# Patient Record
Sex: FEMALE | Race: WHITE | NOT HISPANIC OR LATINO | URBAN - METROPOLITAN AREA
[De-identification: names, ages, dates, MRNs, and addresses within clinical notes are randomized per-mention and may not be internally consistent; named-entity substitution may affect disease eponyms.]

---

## 2023-09-06 ENCOUNTER — TELEPHONE (OUTPATIENT)
Dept: PRIMARY CARE | Facility: CLINIC | Age: 58
End: 2023-09-06
Payer: COMMERCIAL

## 2023-09-06 NOTE — TELEPHONE ENCOUNTER
Called patient and scheduled patient for hospital follow up for 9/8/23 at 130pm. Patient was discharged on 9/5/23. Patient doing well doesn't have any questions at this time.

## 2023-09-08 ENCOUNTER — OFFICE VISIT (OUTPATIENT)
Dept: PRIMARY CARE | Facility: CLINIC | Age: 58
End: 2023-09-08
Payer: COMMERCIAL

## 2023-09-08 VITALS
OXYGEN SATURATION: 98 % | DIASTOLIC BLOOD PRESSURE: 62 MMHG | WEIGHT: 174 LBS | HEART RATE: 65 BPM | BODY MASS INDEX: 32.88 KG/M2 | SYSTOLIC BLOOD PRESSURE: 110 MMHG

## 2023-09-08 DIAGNOSIS — Z00.00 ANNUAL PHYSICAL EXAM: Primary | ICD-10-CM

## 2023-09-08 DIAGNOSIS — Z79.4 TYPE 2 DIABETES MELLITUS WITH DIABETIC POLYNEUROPATHY, WITH LONG-TERM CURRENT USE OF INSULIN (MULTI): ICD-10-CM

## 2023-09-08 DIAGNOSIS — I48.0 PAROXYSMAL ATRIAL FIBRILLATION (MULTI): ICD-10-CM

## 2023-09-08 DIAGNOSIS — E78.5 HYPERLIPIDEMIA, UNSPECIFIED HYPERLIPIDEMIA TYPE: ICD-10-CM

## 2023-09-08 DIAGNOSIS — J43.8 OTHER EMPHYSEMA (MULTI): ICD-10-CM

## 2023-09-08 DIAGNOSIS — E11.42 TYPE 2 DIABETES MELLITUS WITH DIABETIC POLYNEUROPATHY, WITH LONG-TERM CURRENT USE OF INSULIN (MULTI): ICD-10-CM

## 2023-09-08 DIAGNOSIS — F41.9 ANXIETY AND DEPRESSION: ICD-10-CM

## 2023-09-08 DIAGNOSIS — E11.42 DIABETIC POLYNEUROPATHY ASSOCIATED WITH TYPE 2 DIABETES MELLITUS (MULTI): ICD-10-CM

## 2023-09-08 DIAGNOSIS — I10 BENIGN ESSENTIAL HYPERTENSION: ICD-10-CM

## 2023-09-08 DIAGNOSIS — F32.A ANXIETY AND DEPRESSION: ICD-10-CM

## 2023-09-08 DIAGNOSIS — J84.9 INTERSTITIAL LUNG DISEASE (MULTI): ICD-10-CM

## 2023-09-08 PROBLEM — E11.9 DIABETES MELLITUS (MULTI): Status: ACTIVE | Noted: 2023-09-08

## 2023-09-08 PROCEDURE — 1036F TOBACCO NON-USER: CPT | Performed by: STUDENT IN AN ORGANIZED HEALTH CARE EDUCATION/TRAINING PROGRAM

## 2023-09-08 PROCEDURE — 3078F DIAST BP <80 MM HG: CPT | Performed by: STUDENT IN AN ORGANIZED HEALTH CARE EDUCATION/TRAINING PROGRAM

## 2023-09-08 PROCEDURE — 3074F SYST BP LT 130 MM HG: CPT | Performed by: STUDENT IN AN ORGANIZED HEALTH CARE EDUCATION/TRAINING PROGRAM

## 2023-09-08 PROCEDURE — 96127 BRIEF EMOTIONAL/BEHAV ASSMT: CPT | Performed by: STUDENT IN AN ORGANIZED HEALTH CARE EDUCATION/TRAINING PROGRAM

## 2023-09-08 PROCEDURE — 99396 PREV VISIT EST AGE 40-64: CPT | Performed by: STUDENT IN AN ORGANIZED HEALTH CARE EDUCATION/TRAINING PROGRAM

## 2023-09-08 PROCEDURE — 99214 OFFICE O/P EST MOD 30 MIN: CPT | Performed by: STUDENT IN AN ORGANIZED HEALTH CARE EDUCATION/TRAINING PROGRAM

## 2023-09-08 RX ORDER — INSULIN GLARGINE 100 [IU]/ML
INJECTION, SOLUTION SUBCUTANEOUS NIGHTLY
COMMUNITY
Start: 2023-03-25 | End: 2023-10-27 | Stop reason: SDUPTHER

## 2023-09-08 RX ORDER — SERTRALINE HYDROCHLORIDE 100 MG/1
150 TABLET, FILM COATED ORAL DAILY
Qty: 90 TABLET | Refills: 1 | Status: SHIPPED | OUTPATIENT
Start: 2023-09-08 | End: 2023-10-27 | Stop reason: SDUPTHER

## 2023-09-08 RX ORDER — BUSPIRONE HYDROCHLORIDE 7.5 MG/1
7.5 TABLET ORAL 2 TIMES DAILY
COMMUNITY
Start: 2022-11-17 | End: 2023-09-08 | Stop reason: SDUPTHER

## 2023-09-08 RX ORDER — BUSPIRONE HYDROCHLORIDE 7.5 MG/1
7.5 TABLET ORAL 2 TIMES DAILY
Qty: 180 TABLET | Refills: 1 | Status: SHIPPED | OUTPATIENT
Start: 2023-09-08 | End: 2023-10-27 | Stop reason: SDUPTHER

## 2023-09-08 RX ORDER — GABAPENTIN 300 MG/1
300 CAPSULE ORAL 4 TIMES DAILY
COMMUNITY
Start: 2018-01-05 | End: 2023-09-08 | Stop reason: SDUPTHER

## 2023-09-08 RX ORDER — GABAPENTIN 300 MG/1
300 CAPSULE ORAL 4 TIMES DAILY
Qty: 120 CAPSULE | Refills: 1 | Status: SHIPPED | OUTPATIENT
Start: 2023-09-08 | End: 2023-09-28

## 2023-09-08 RX ORDER — ALLOPURINOL 300 MG/1
300 TABLET ORAL DAILY
COMMUNITY
End: 2023-10-27 | Stop reason: SDUPTHER

## 2023-09-08 RX ORDER — LIRAGLUTIDE 6 MG/ML
1.8 INJECTION SUBCUTANEOUS DAILY
COMMUNITY
End: 2023-10-27 | Stop reason: SDUPTHER

## 2023-09-08 RX ORDER — CHOLECALCIFEROL (VITAMIN D3) 25 MCG
25 TABLET ORAL DAILY
COMMUNITY
Start: 2023-08-20 | End: 2023-10-27 | Stop reason: SDUPTHER

## 2023-09-08 RX ORDER — LORAZEPAM 1 MG/1
1 TABLET ORAL EVERY 6 HOURS PRN
COMMUNITY
Start: 2022-03-01 | End: 2024-02-06 | Stop reason: SDUPTHER

## 2023-09-08 RX ORDER — PIOGLITAZONEHYDROCHLORIDE 45 MG/1
45 TABLET ORAL
COMMUNITY
Start: 2023-09-05 | End: 2023-10-27 | Stop reason: SDUPTHER

## 2023-09-08 RX ORDER — METOPROLOL SUCCINATE 50 MG/1
50 TABLET, EXTENDED RELEASE ORAL DAILY
COMMUNITY
End: 2023-10-27 | Stop reason: SDUPTHER

## 2023-09-08 RX ORDER — PREDNISONE 20 MG/1
20 TABLET ORAL DAILY
COMMUNITY
Start: 2023-09-05 | End: 2023-10-24 | Stop reason: ALTCHOICE

## 2023-09-08 RX ORDER — BLOOD-GLUCOSE METER
KIT MISCELLANEOUS
COMMUNITY
Start: 2017-11-28 | End: 2024-02-13 | Stop reason: SDUPTHER

## 2023-09-08 RX ORDER — SERTRALINE HYDROCHLORIDE 100 MG/1
150 TABLET, FILM COATED ORAL DAILY
COMMUNITY
End: 2023-09-08 | Stop reason: SDUPTHER

## 2023-09-08 RX ORDER — ALBUTEROL SULFATE 90 UG/1
2 AEROSOL, METERED RESPIRATORY (INHALATION) EVERY 6 HOURS PRN
COMMUNITY

## 2023-09-08 RX ORDER — ATORVASTATIN CALCIUM 80 MG/1
80 TABLET, FILM COATED ORAL DAILY
COMMUNITY
End: 2023-10-27 | Stop reason: SDUPTHER

## 2023-09-08 RX ORDER — ACETAMINOPHEN 500 MG
500 TABLET ORAL AS NEEDED
COMMUNITY
Start: 2023-06-17

## 2023-09-08 RX ORDER — NYSTATIN 100000 [USP'U]/ML
10 SUSPENSION ORAL
COMMUNITY
Start: 2023-09-05 | End: 2024-02-05 | Stop reason: WASHOUT

## 2023-09-08 RX ORDER — ROPINIROLE 1 MG/1
1 TABLET, FILM COATED ORAL NIGHTLY
COMMUNITY
Start: 2022-06-29 | End: 2023-12-06 | Stop reason: SDUPTHER

## 2023-09-08 NOTE — PROGRESS NOTES
Subjective   Patient ID: Radha Dela Cruz is a 58 y.o. female who presents for the following    Assessment/Plan     KESHAV 9/8/2023  Physical: 9/8/2023    #Acute interstitial lung disease; NOS  #Hx of Septic Shock  #HTN  #HLD  #DM-2  #DOMINGO  #Paroxysmal A-Fib  #Preventative Medicine   #COPD    -Colonoscopy 4-5 years ago; due for one now as she had multiple polyps removed. Will give referall to GI  -MMG: had one 2-3 years ago. Pt would like to wait until her current lung issues are resolved before MMG.   -PAP Smear: patient had cervix and uterus removed in 1999.   -UTD: has not had COVID vaccine. Does not want it. Will get flu shot after lung disease is treated.   -PHQ2: 0 while on medication   -Hospital day of discharge labs reviewed     PLAN  Labs: CBC, CMP, lipid profile, A1c  CXR order given   Refill gabapentin  Refill zoloft  Refill buspar  Continue prednisone 20mg daily.   Albuterol PRN for SOB   Continue insulin, actos for DM. Will adjust pending A1c.   Continue xarelto for A-Fib   Edema in legs is non-pitting and likely venous stasis related.   In the long term patient may need open lung bx if symptoms reoccur or worsen.       Follow up in 1 month   Follow up with pulmonary medicine as scheduled       OARRS reviewed.   PDMP consent signed and in chart 9/8/2023. UDS to be done at next visit.     HPI  58F presents as post hospital follow up.   She was admitted for sepsis, acute hypoxic respiratory failure and possible acute interstitial pneumonia vs  vs AEP. Pt was treated in the hospital with IV steroids, Inhaled bronchodilators, and IV abx. She also had bronchoscopy done by pulmonary medicine. Pt was discharged on 9/5/2023 in improved and stable state.     Currently she still has some SOB and cough but it is improved from when in hospital. She is able to walk without use of supplemental O2. Denies any new symptoms that have occurred. Cough is mostly present when she lays down. Denies any PND or orthopnea. Has mild  BLLE edema.     Denies fevers, chills, weight loss, lightheadedness, dizziness, vision changes, sore throat, runny nose, CP, n/v/d, abd pain, black/bloody stools, arthralgias, or new numbness/weakness/tingling in arms/legs/face.      No other complaints or concerns at this time.     PHQ2: 0 while on medication    PMH: Gout, HLD, DM, Anxiety, Paroxymal A-Fib,     Social Hx  T: quit about 2 weeks ago  A: denies  D: denies    Family Hx:   Maternal: No significant hx reported outside of CVA  Paternal: CAD    Lives with , sister, and son        Visit Vitals  /62   Pulse 65   Wt 78.9 kg (174 lb)   SpO2 98%   BMI 32.88 kg/m²   Smoking Status Never   BSA 1.84 m²     PHYSICAL EXAM     Physical Exam     Visit Vitals  /62   Pulse 65   Wt 78.9 kg (174 lb)   SpO2 98%   BMI 32.88 kg/m²   Smoking Status Never   BSA 1.84 m²        General: NAD. NCAT. Aox3   HEENT: PERRLA. EOMI. MMM. Nares patent bl.  Cardiovascular: RRR. No MRG. S1/S2 wnl.   Respiratory: No wheezing, rales, rhonchi. No acute respiratory distress. Minimally prolonged expiratory phase.   GI: Soft, NT abdomen. BS present x 4.   MSK: ROM x 4. CTLS non-tender.   Extremities: BL trace non-pitting edema of the LEs. Cap refill < 2 sec.   Skin: No rashes or bruises.   Neuro: Aox3. Cranial Nerves grossly intact. Motor/sensory wnl.   Psych: Mood wnl.    REVIEW OF SYSTEMS     ROS In HPI     No Known Allergies    Current Outpatient Medications   Medication Sig Dispense Refill    acetaminophen (Tylenol) 500 mg tablet Take 1 tablet (500 mg) by mouth if needed.      albuterol 90 mcg/actuation inhaler Inhale 2 puffs every 6 hours if needed for shortness of breath.      allopurinol (Zyloprim) 300 mg tablet Take 1 tablet (300 mg) by mouth once daily.      atorvastatin (Lipitor) 80 mg tablet Take 1 tablet (80 mg) by mouth once daily.      busPIRone (Buspar) 7.5 mg tablet Take 1 tablet (7.5 mg) by mouth 2 times a day.      cholecalciferol (Vitamin D-3) 25 MCG (1000  UT) tablet Take 1 tablet (25 mcg) by mouth once daily.      FreeStyle Lite Strips strip       gabapentin (Neurontin) 300 mg capsule Take 1 capsule (300 mg) by mouth 4 times a day.      Lantus Solostar U-100 Insulin 100 unit/mL (3 mL) pen Inject under the skin once daily at bedtime.      LORazepam (Ativan) 1 mg tablet Take 1 tablet (1 mg) by mouth every 6 hours if needed.      metoprolol succinate XL (Toprol-XL) 50 mg 24 hr tablet Take 1 tablet (50 mg) by mouth once daily.      nystatin (Mycostatin) 100,000 unit/mL suspension Take 10 mL (1,000,000 Units) by mouth.      pioglitazone (Actos) 45 mg tablet Take 1 tablet (45 mg) by mouth once daily.      predniSONE (Deltasone) 20 mg tablet Take 1 tablet (20 mg) by mouth once daily.      rivaroxaban (Xarelto) 20 mg tablet Take 1 tablet (20 mg) by mouth once daily in the evening. Take with meals.      rOPINIRole (Requip) 1 mg tablet Take 1 tablet (1 mg) by mouth once daily at bedtime.      sertraline (Zoloft) 100 mg tablet Take 1.5 tablets (150 mg) by mouth once daily.      Victoza 3-Chetan 0.6 mg/0.1 mL (18 mg/3 mL) injection Inject 0.3 mL (1.8 mg) under the skin once daily.       No current facility-administered medications for this visit.       Objective     No visits with results within 4 Month(s) from this visit.   Latest known visit with results is:   No results found for any previous visit.       Radiology: Reviewed imaging in powerchart.  No results found.    No family history on file.  Social History     Socioeconomic History    Marital status:      Spouse name: None    Number of children: None    Years of education: None    Highest education level: None   Occupational History    None   Tobacco Use    Smoking status: Never    Smokeless tobacco: Never   Substance and Sexual Activity    Alcohol use: Not Currently    Drug use: Never    Sexual activity: None   Other Topics Concern    None   Social History Narrative    None     Social Determinants of Health      Financial Resource Strain: Not on file   Food Insecurity: Not on file   Transportation Needs: Not on file   Physical Activity: Not on file   Stress: Not on file   Social Connections: Not on file   Intimate Partner Violence: Not on file   Housing Stability: Not on file     No past medical history on file.  Past Surgical History:   Procedure Laterality Date    OTHER SURGICAL HISTORY  2022    Rotator cuff repair    OTHER SURGICAL HISTORY  2022    Knee replacement    OTHER SURGICAL HISTORY  2022    Bone biopsy    OTHER SURGICAL HISTORY  2022     section    OTHER SURGICAL HISTORY  2022    Hysterectomy    OTHER SURGICAL HISTORY  2022    Colonoscopy complete for polypectomy       Charting was completed using voice recognition technology and may include unintended errors.

## 2023-10-03 ENCOUNTER — TELEPHONE (OUTPATIENT)
Dept: PRIMARY CARE | Facility: CLINIC | Age: 58
End: 2023-10-03
Payer: COMMERCIAL

## 2023-10-03 NOTE — TELEPHONE ENCOUNTER
Spoke to pt - informed of results.     Per Dr. Mccollum: CXR shows complete resolution of previous disease seen on CXR. labs show stable low platelets and advised to watch for signs/ symptoms of bleeding.

## 2023-10-17 ENCOUNTER — DOCUMENTATION (OUTPATIENT)
Dept: PRIMARY CARE | Facility: CLINIC | Age: 58
End: 2023-10-17
Payer: COMMERCIAL

## 2023-10-17 ENCOUNTER — TELEPHONE (OUTPATIENT)
Dept: PRIMARY CARE | Facility: CLINIC | Age: 58
End: 2023-10-17
Payer: COMMERCIAL

## 2023-10-17 NOTE — PROGRESS NOTES
A1c reviewed from outside facility   7.8% reported    Will have staff call to schedule appointment as she was a no show at last scheduled appointment.

## 2023-10-17 NOTE — TELEPHONE ENCOUNTER
Tried calling pt to get her scheduled for an appointment with Dr. Mccollum sometime this week to follow up / review A1C. Lvm for pt to return call.

## 2023-10-19 ENCOUNTER — PATIENT MESSAGE (OUTPATIENT)
Dept: PRIMARY CARE | Facility: CLINIC | Age: 58
End: 2023-10-19
Payer: COMMERCIAL

## 2023-10-20 NOTE — TELEPHONE ENCOUNTER
Pt is aware to schedule an appointment with Dr. Mccollum- stated she has had a lot going on at home lately so unable to schedule at the time. Informed her to let us know when she can come in as she does need to go over A1C results.

## 2023-10-23 ENCOUNTER — TELEPHONE (OUTPATIENT)
Dept: PRIMARY CARE | Facility: CLINIC | Age: 58
End: 2023-10-23
Payer: COMMERCIAL

## 2023-10-23 DIAGNOSIS — E11.42 DIABETIC POLYNEUROPATHY ASSOCIATED WITH TYPE 2 DIABETES MELLITUS (MULTI): ICD-10-CM

## 2023-10-23 NOTE — TELEPHONE ENCOUNTER
Pt called office- stated having chills, pain with breathing and diarrhea. I scheduled her for virtual tomorrow morning as requested by patient but she wants to know if this is urgent and if there is anything she should do in the meantime.

## 2023-10-23 NOTE — TELEPHONE ENCOUNTER
Spoke to pt informed her if SOB, chest pain or chest pain with breathing to go to ER. Pt expressed understanding.

## 2023-10-24 ENCOUNTER — TELEMEDICINE (OUTPATIENT)
Dept: PRIMARY CARE | Facility: CLINIC | Age: 58
End: 2023-10-24
Payer: COMMERCIAL

## 2023-10-24 VITALS — WEIGHT: 174 LBS | BODY MASS INDEX: 32.85 KG/M2 | HEIGHT: 61 IN

## 2023-10-24 DIAGNOSIS — Z87.01 HISTORY OF PNEUMONIA: ICD-10-CM

## 2023-10-24 DIAGNOSIS — J06.9 UPPER RESPIRATORY TRACT INFECTION, UNSPECIFIED TYPE: Primary | ICD-10-CM

## 2023-10-24 DIAGNOSIS — J06.9 UPPER RESPIRATORY TRACT INFECTION, UNSPECIFIED TYPE: ICD-10-CM

## 2023-10-24 DIAGNOSIS — J44.1 COPD EXACERBATION (MULTI): ICD-10-CM

## 2023-10-24 PROCEDURE — 99443 PR PHYS/QHP TELEPHONE EVALUATION 21-30 MIN: CPT | Performed by: STUDENT IN AN ORGANIZED HEALTH CARE EDUCATION/TRAINING PROGRAM

## 2023-10-24 RX ORDER — PREDNISONE 20 MG/1
40 TABLET ORAL DAILY
Qty: 10 TABLET | Refills: 0 | Status: SHIPPED | OUTPATIENT
Start: 2023-10-24 | End: 2023-10-29

## 2023-10-24 RX ORDER — LEVOFLOXACIN 250 MG/1
750 TABLET ORAL DAILY
Qty: 21 TABLET | Refills: 0 | Status: SHIPPED | OUTPATIENT
Start: 2023-10-24 | End: 2023-10-31

## 2023-10-24 RX ORDER — GABAPENTIN 300 MG/1
300 CAPSULE ORAL 4 TIMES DAILY
Qty: 120 CAPSULE | Refills: 0 | Status: SHIPPED | OUTPATIENT
Start: 2023-10-24 | End: 2023-12-05

## 2023-10-24 ASSESSMENT — PAIN SCALES - GENERAL: PAINLEVEL: 7

## 2023-10-24 NOTE — PROGRESS NOTES
"Subjective   Patient ID: Radha Dela Cruz is a 58 y.o. female who presents for the following    Assessment/Plan     KESHAV 9/8/2023  Physical: 9/8/2023    #Upper Respiratory Infection   #COPD Exacerbation  #Possible PNA   #Acute interstitial lung disease; NOS    PLAN  -Pt does not want to come into the hospital at this time.   -Advised her to please call EMS or come to ED if SOB worsens, she has new CP, fevers/chills, lightheadedness, dizziness due to her previous hx of septic shock from PNA.   -Levaquin 750mg PO daily x 7 days   -Prednisone 40mg PO daily x 5 days   -Continue using inhalers daily         OARRS reviewed.   PDMP consent signed and in chart 9/8/2023. UDS to be done at next visit.     HPI    58F presents for acute visit. She has had a dry cough for the past 2-3 days. She has associated loose watery stools. Otherwise no current fevers, but she had chills 2 days ago but otherwise no other symptoms. Denies any CP, SOB at this time. Patient was hospitalized in Sept 2023 for Acute respiratory failure 2/2 AEP vs  ILD vs  vs resistant PNA. She states symptoms currently are not as severe as her previous hospitalization and that she would like to try treatment at home before having to come to the hospital.     No other complaints or concerns at this time.     PHQ2: 0 while on medication    PMH: Gout, HLD, DM, Anxiety, Paroxymal A-Fib,     Social Hx  T: quit about 2 weeks ago  A: denies  D: denies    Family Hx:   Maternal: No significant hx reported outside of CVA  Paternal: CAD    Lives with , sister, and son        Visit Vitals  Ht 1.549 m (5' 1\")   Wt 78.9 kg (174 lb)   BMI 32.88 kg/m²   Smoking Status Never   BSA 1.84 m²     PHYSICAL EXAM     Physical Exam     Visit Vitals  Ht 1.549 m (5' 1\")   Wt 78.9 kg (174 lb)   BMI 32.88 kg/m²   Smoking Status Never   BSA 1.84 m²        Deferred     REVIEW OF SYSTEMS     ROS In HPI     No Known Allergies    Current Outpatient Medications   Medication Sig Dispense " Refill    acetaminophen (Tylenol) 500 mg tablet Take 1 tablet (500 mg) by mouth if needed.      albuterol 90 mcg/actuation inhaler Inhale 2 puffs every 6 hours if needed for shortness of breath.      allopurinol (Zyloprim) 300 mg tablet Take 1 tablet (300 mg) by mouth once daily.      atorvastatin (Lipitor) 80 mg tablet Take 1 tablet (80 mg) by mouth once daily.      busPIRone (Buspar) 7.5 mg tablet Take 1 tablet (7.5 mg) by mouth 2 times a day. 180 tablet 1    cholecalciferol (Vitamin D-3) 25 MCG (1000 UT) tablet Take 1 tablet (25 mcg) by mouth once daily.      FreeStyle Lite Strips strip       gabapentin (Neurontin) 300 mg capsule TAKE 1 CAPSULE BY MOUTH FOUR TIMES DAILY 120 capsule 0    Lantus Solostar U-100 Insulin 100 unit/mL (3 mL) pen Inject under the skin once daily at bedtime.      LORazepam (Ativan) 1 mg tablet Take 1 tablet (1 mg) by mouth every 6 hours if needed.      metoprolol succinate XL (Toprol-XL) 50 mg 24 hr tablet Take 1 tablet (50 mg) by mouth once daily.      pioglitazone (Actos) 45 mg tablet Take 1 tablet (45 mg) by mouth once daily.      rivaroxaban (Xarelto) 20 mg tablet Take 1 tablet (20 mg) by mouth once daily in the evening. Take with meals.      rOPINIRole (Requip) 1 mg tablet Take 1 tablet (1 mg) by mouth once daily at bedtime.      sertraline (Zoloft) 100 mg tablet Take 1.5 tablets (150 mg) by mouth once daily. 90 tablet 1    Victoza 3-Chetan 0.6 mg/0.1 mL (18 mg/3 mL) injection Inject 0.3 mL (1.8 mg) under the skin once daily.      nystatin (Mycostatin) 100,000 unit/mL suspension Take 10 mL (1,000,000 Units) by mouth.      predniSONE (Deltasone) 20 mg tablet Take 1 tablet (20 mg) by mouth once daily.       No current facility-administered medications for this visit.       Objective     No visits with results within 4 Month(s) from this visit.   Latest known visit with results is:   No results found for any previous visit.       Radiology: Reviewed imaging in powerchart.  No results  found.    No family history on file.  Social History     Socioeconomic History    Marital status:      Spouse name: None    Number of children: None    Years of education: None    Highest education level: None   Occupational History    None   Tobacco Use    Smoking status: Never    Smokeless tobacco: Never   Substance and Sexual Activity    Alcohol use: Not Currently    Drug use: Never    Sexual activity: None   Other Topics Concern    None   Social History Narrative    None     Social Determinants of Health     Financial Resource Strain: Not on file   Food Insecurity: Not on file   Transportation Needs: Not on file   Physical Activity: Not on file   Stress: Not on file   Social Connections: Not on file   Intimate Partner Violence: Not on file   Housing Stability: Not on file     No past medical history on file.  Past Surgical History:   Procedure Laterality Date    OTHER SURGICAL HISTORY  2022    Rotator cuff repair    OTHER SURGICAL HISTORY  2022    Knee replacement    OTHER SURGICAL HISTORY  2022    Bone biopsy    OTHER SURGICAL HISTORY  2022     section    OTHER SURGICAL HISTORY  2022    Hysterectomy    OTHER SURGICAL HISTORY  2022    Colonoscopy complete for polypectomy       Charting was completed using voice recognition technology and may include unintended errors.

## 2023-10-25 RX ORDER — PREDNISONE 20 MG/1
40 TABLET ORAL DAILY
Qty: 10 TABLET | Refills: 0 | OUTPATIENT
Start: 2023-10-25 | End: 2023-10-30

## 2023-10-27 DIAGNOSIS — E78.2 MODERATE MIXED HYPERLIPIDEMIA NOT REQUIRING STATIN THERAPY: ICD-10-CM

## 2023-10-27 DIAGNOSIS — E78.5 DYSLIPIDEMIA: ICD-10-CM

## 2023-10-27 DIAGNOSIS — Z79.4 TYPE 2 DIABETES MELLITUS WITH DIABETIC POLYNEUROPATHY, WITH LONG-TERM CURRENT USE OF INSULIN (MULTI): ICD-10-CM

## 2023-10-27 DIAGNOSIS — F41.9 ANXIETY AND DEPRESSION: ICD-10-CM

## 2023-10-27 DIAGNOSIS — M10.9 GOUT, UNSPECIFIED CAUSE, UNSPECIFIED CHRONICITY, UNSPECIFIED SITE: ICD-10-CM

## 2023-10-27 DIAGNOSIS — E55.9 VITAMIN D DEFICIENCY DISEASE: ICD-10-CM

## 2023-10-27 DIAGNOSIS — F32.A ANXIETY AND DEPRESSION: ICD-10-CM

## 2023-10-27 DIAGNOSIS — I48.0 PAROXYSMAL ATRIAL FIBRILLATION (MULTI): ICD-10-CM

## 2023-10-27 DIAGNOSIS — I10 BENIGN ESSENTIAL HYPERTENSION: Primary | ICD-10-CM

## 2023-10-27 DIAGNOSIS — E11.42 TYPE 2 DIABETES MELLITUS WITH DIABETIC POLYNEUROPATHY, WITH LONG-TERM CURRENT USE OF INSULIN (MULTI): ICD-10-CM

## 2023-10-27 RX ORDER — LORAZEPAM 1 MG/1
1 TABLET ORAL EVERY 6 HOURS PRN
Qty: 30 TABLET | Refills: 0 | OUTPATIENT
Start: 2023-10-27

## 2023-10-27 RX ORDER — LIRAGLUTIDE 6 MG/ML
1.8 INJECTION SUBCUTANEOUS DAILY
Qty: 3 ML | Refills: 2 | Status: SHIPPED | OUTPATIENT
Start: 2023-10-27 | End: 2023-11-27

## 2023-10-27 RX ORDER — PIOGLITAZONEHYDROCHLORIDE 45 MG/1
45 TABLET ORAL
Qty: 90 TABLET | Refills: 3 | Status: SHIPPED | OUTPATIENT
Start: 2023-10-27 | End: 2024-02-06 | Stop reason: SINTOL

## 2023-10-27 RX ORDER — ALLOPURINOL 300 MG/1
300 TABLET ORAL DAILY
Qty: 90 TABLET | Refills: 3 | Status: SHIPPED | OUTPATIENT
Start: 2023-10-27

## 2023-10-27 RX ORDER — INSULIN GLARGINE 100 [IU]/ML
INJECTION, SOLUTION SUBCUTANEOUS NIGHTLY
Qty: 3 ML | OUTPATIENT
Start: 2023-10-27

## 2023-10-27 RX ORDER — ATORVASTATIN CALCIUM 80 MG/1
80 TABLET, FILM COATED ORAL DAILY
Qty: 90 TABLET | Refills: 3 | Status: SHIPPED | OUTPATIENT
Start: 2023-10-27 | End: 2024-02-06 | Stop reason: SDUPTHER

## 2023-10-27 RX ORDER — CHOLECALCIFEROL (VITAMIN D3) 25 MCG
1000 TABLET ORAL DAILY
Qty: 90 TABLET | Refills: 3 | Status: SHIPPED | OUTPATIENT
Start: 2023-10-27 | End: 2023-12-06 | Stop reason: SDUPTHER

## 2023-10-27 RX ORDER — METOPROLOL SUCCINATE 50 MG/1
50 TABLET, EXTENDED RELEASE ORAL DAILY
Qty: 90 TABLET | Refills: 3 | Status: SHIPPED | OUTPATIENT
Start: 2023-10-27

## 2023-10-27 RX ORDER — SERTRALINE HYDROCHLORIDE 100 MG/1
150 TABLET, FILM COATED ORAL DAILY
Qty: 90 TABLET | Refills: 3 | Status: SHIPPED | OUTPATIENT
Start: 2023-10-27

## 2023-10-27 RX ORDER — BUSPIRONE HYDROCHLORIDE 7.5 MG/1
7.5 TABLET ORAL 2 TIMES DAILY
Qty: 180 TABLET | Refills: 3 | Status: SHIPPED | OUTPATIENT
Start: 2023-10-27

## 2023-10-30 RX ORDER — INSULIN GLARGINE 100 [IU]/ML
INJECTION, SOLUTION SUBCUTANEOUS
Qty: 3 ML | Refills: 3 | Status: SHIPPED | OUTPATIENT
Start: 2023-10-30 | End: 2024-02-06 | Stop reason: SDUPTHER

## 2023-11-02 RX ORDER — LEVOFLOXACIN 250 MG/1
750 TABLET ORAL DAILY
Qty: 21 TABLET | Refills: 0 | OUTPATIENT
Start: 2023-11-02 | End: 2023-11-09

## 2023-11-08 ENCOUNTER — APPOINTMENT (OUTPATIENT)
Dept: PRIMARY CARE | Facility: CLINIC | Age: 58
End: 2023-11-08
Payer: COMMERCIAL

## 2023-11-22 DIAGNOSIS — E11.42 TYPE 2 DIABETES MELLITUS WITH DIABETIC POLYNEUROPATHY, WITH LONG-TERM CURRENT USE OF INSULIN (MULTI): ICD-10-CM

## 2023-11-22 DIAGNOSIS — Z79.4 TYPE 2 DIABETES MELLITUS WITH DIABETIC POLYNEUROPATHY, WITH LONG-TERM CURRENT USE OF INSULIN (MULTI): ICD-10-CM

## 2023-11-27 RX ORDER — LIRAGLUTIDE 6 MG/ML
1.8 INJECTION SUBCUTANEOUS DAILY
Qty: 3 ML | Refills: 2 | Status: SHIPPED | OUTPATIENT
Start: 2023-11-27 | End: 2024-03-19

## 2023-12-06 ENCOUNTER — TELEMEDICINE (OUTPATIENT)
Dept: PRIMARY CARE | Facility: CLINIC | Age: 58
End: 2023-12-06
Payer: COMMERCIAL

## 2023-12-06 DIAGNOSIS — G25.81 RESTLESS LEG SYNDROME: Primary | ICD-10-CM

## 2023-12-06 DIAGNOSIS — Z12.11 SCREENING FOR MALIGNANT NEOPLASM OF COLON: ICD-10-CM

## 2023-12-06 DIAGNOSIS — I48.0 PAROXYSMAL ATRIAL FIBRILLATION (MULTI): ICD-10-CM

## 2023-12-06 DIAGNOSIS — E55.9 VITAMIN D DEFICIENCY DISEASE: ICD-10-CM

## 2023-12-06 PROCEDURE — 99442 PR PHYS/QHP TELEPHONE EVALUATION 11-20 MIN: CPT | Performed by: STUDENT IN AN ORGANIZED HEALTH CARE EDUCATION/TRAINING PROGRAM

## 2023-12-06 RX ORDER — CHOLECALCIFEROL (VITAMIN D3) 25 MCG
1000 TABLET ORAL DAILY
Qty: 90 TABLET | Refills: 3 | Status: SHIPPED | OUTPATIENT
Start: 2023-12-06

## 2023-12-06 RX ORDER — ROPINIROLE 1 MG/1
1 TABLET, FILM COATED ORAL NIGHTLY
Qty: 90 TABLET | Refills: 3 | Status: SHIPPED | OUTPATIENT
Start: 2023-12-06 | End: 2024-12-05

## 2023-12-06 NOTE — PROGRESS NOTES
Subjective   Patient ID: Radha Dela Cruz is a 58 y.o. female who presents for the following    Assessment/Plan     KESHAV 9/8/2023  Physical: 9/8/2023    #Acute interstitial lung disease; NOS  #Hx of Septic Shock  #HTN  #HLD  #DM-2  #DOMINGO  #Paroxysmal A-Fib  #Preventative Medicine   #COPD    A1c: 7.8. Continue current medication and low carb diet.     -Colonoscopy 4-5 years ago; due for one now as she had multiple polyps removed. Will give referall to GI. Still has to schedule. Referral to Dr Mckenna at  given.   -MMG: had one 2-3 years ago. Pt would like to wait until her current lung issues are resolved before MMG.   -PAP Smear: patient had cervix and uterus removed in 1999.   -UTD: has not had COVID vaccine. Does not want it. Will get flu shot after lung disease is treated.   -PHQ2: 0 while on medication   -Hospital day of discharge labs reviewed     PLAN  -Has repeat CT chest scheduled with Dr Johnson  -Refills given for Xarelto, Requip, and Vitamin   -Albuterol PRN for SOB   -Continue insulin, actos, victoza for DM.    -In the long term patient may need open lung bx if symptoms reoccur or worsen.   Follow up with pulmonary medicine as scheduled       OARRS reviewed.   PDMP consent signed and in chart 9/8/2023. UDS to be done at next visit.     HPI    Virtual or Telephone Consent    A telephone visit (audio only) between the patient (at the originating site) and the provider (at the distant site) was utilized to provide this telehealth service.   Verbal consent was requested and obtained from Radha Dela Cruz on this date, 12/06/23 for a telehealth visit.     58F presents for medical refill and follow up. She is doing well. Her SOB is at baseline. She follows up with Dr Johnson and has a CT Chest pending for re-evaluation of her underlying lung disease ( vs AEP).     Denies fevers, chills, weight loss, lightheadedness, dizziness, vision changes, sore throat, runny nose, CP, n/v/d, abd pain, black/bloody stools,  arthralgias, or new numbness/weakness/tingling in arms/legs/face.      No other complaints or concerns at this time.     PHQ2: 0 while on medication    PMH: Gout, HLD, DM, Anxiety, Paroxymal A-Fib,     Social Hx  T: quit about 2 weeks ago  A: denies  D: denies    Family Hx:   Maternal: No significant hx reported outside of CVA  Paternal: CAD    Lives with , sister, and son        Visit Vitals  Smoking Status Never     PHYSICAL EXAM     Physical Exam     Visit Vitals  Smoking Status Never      Deferred      REVIEW OF SYSTEMS     ROS In HPI     No Known Allergies    Current Outpatient Medications   Medication Sig Dispense Refill    acetaminophen (Tylenol) 500 mg tablet Take 1 tablet (500 mg) by mouth if needed.      albuterol 90 mcg/actuation inhaler Inhale 2 puffs every 6 hours if needed for shortness of breath.      allopurinol (Zyloprim) 300 mg tablet Take 1 tablet (300 mg) by mouth once daily. 90 tablet 3    atorvastatin (Lipitor) 80 mg tablet Take 1 tablet (80 mg) by mouth once daily. 90 tablet 3    busPIRone (Buspar) 7.5 mg tablet Take 1 tablet (7.5 mg) by mouth 2 times a day. 180 tablet 3    cholecalciferol (Vitamin D-3) 25 MCG (1000 UT) tablet Take 1 tablet (1,000 Units) by mouth once daily. 90 tablet 3    FreeStyle Lite Strips strip       gabapentin (Neurontin) 300 mg capsule TAKE 1 CAPSULE BY MOUTH FOUR TIMES DAILY 120 capsule 0    Lantus Solostar U-100 Insulin 100 unit/mL (3 mL) pen Inject 45 units once daily at nighttime. 3 mL 3    LORazepam (Ativan) 1 mg tablet Take 1 tablet (1 mg) by mouth every 6 hours if needed.      metoprolol succinate XL (Toprol-XL) 50 mg 24 hr tablet Take 1 tablet (50 mg) by mouth once daily. 90 tablet 3    pioglitazone (Actos) 45 mg tablet Take 1 tablet (45 mg) by mouth once daily. 90 tablet 3    rivaroxaban (Xarelto) 20 mg tablet Take 1 tablet (20 mg) by mouth once daily in the evening. Take with meals. 90 tablet 3    rOPINIRole (Requip) 1 mg tablet Take 1 tablet (1 mg) by  mouth once daily at bedtime.      sertraline (Zoloft) 100 mg tablet Take 1.5 tablets (150 mg) by mouth once daily. 90 tablet 3    Victoza 3-Chetan 0.6 mg/0.1 mL (18 mg/3 mL) injection Inject 0.3 mL (1.8 mg) under the skin once daily. 3 mL 2    nystatin (Mycostatin) 100,000 unit/mL suspension Take 10 mL (1,000,000 Units) by mouth.       No current facility-administered medications for this visit.       Objective     No visits with results within 4 Month(s) from this visit.   Latest known visit with results is:   No results found for any previous visit.       Radiology: Reviewed imaging in powerchart.  No results found.    No family history on file.  Social History     Socioeconomic History    Marital status:      Spouse name: None    Number of children: None    Years of education: None    Highest education level: None   Occupational History    None   Tobacco Use    Smoking status: Never    Smokeless tobacco: Never   Substance and Sexual Activity    Alcohol use: Not Currently    Drug use: Never    Sexual activity: None   Other Topics Concern    None   Social History Narrative    None     Social Determinants of Health     Financial Resource Strain: Not on file   Food Insecurity: Not on file   Transportation Needs: Not on file   Physical Activity: Not on file   Stress: Not on file   Social Connections: Not on file   Intimate Partner Violence: Not on file   Housing Stability: Not on file     No past medical history on file.  Past Surgical History:   Procedure Laterality Date    OTHER SURGICAL HISTORY  2022    Rotator cuff repair    OTHER SURGICAL HISTORY  2022    Knee replacement    OTHER SURGICAL HISTORY  2022    Bone biopsy    OTHER SURGICAL HISTORY  2022     section    OTHER SURGICAL HISTORY  2022    Hysterectomy    OTHER SURGICAL HISTORY  2022    Colonoscopy complete for polypectomy       Charting was completed using voice recognition technology and may include  unintended errors.

## 2023-12-21 DIAGNOSIS — E11.42 DIABETIC POLYNEUROPATHY ASSOCIATED WITH TYPE 2 DIABETES MELLITUS (MULTI): ICD-10-CM

## 2023-12-22 RX ORDER — GABAPENTIN 300 MG/1
300 CAPSULE ORAL 4 TIMES DAILY
Qty: 120 CAPSULE | Refills: 0 | OUTPATIENT
Start: 2023-12-22

## 2024-01-17 ENCOUNTER — APPOINTMENT (OUTPATIENT)
Dept: GASTROENTEROLOGY | Facility: CLINIC | Age: 59
End: 2024-01-17
Payer: COMMERCIAL

## 2024-02-05 ENCOUNTER — OFFICE VISIT (OUTPATIENT)
Dept: GASTROENTEROLOGY | Facility: CLINIC | Age: 59
End: 2024-02-05
Payer: COMMERCIAL

## 2024-02-05 ENCOUNTER — LAB (OUTPATIENT)
Dept: LAB | Facility: LAB | Age: 59
End: 2024-02-05
Payer: COMMERCIAL

## 2024-02-05 VITALS
TEMPERATURE: 97.4 F | BODY MASS INDEX: 31.91 KG/M2 | HEIGHT: 61 IN | WEIGHT: 169 LBS | DIASTOLIC BLOOD PRESSURE: 79 MMHG | SYSTOLIC BLOOD PRESSURE: 132 MMHG | HEART RATE: 73 BPM

## 2024-02-05 DIAGNOSIS — K21.9 GASTROESOPHAGEAL REFLUX DISEASE, UNSPECIFIED WHETHER ESOPHAGITIS PRESENT: ICD-10-CM

## 2024-02-05 DIAGNOSIS — R13.19 ESOPHAGEAL DYSPHAGIA: ICD-10-CM

## 2024-02-05 DIAGNOSIS — Z80.0 FAMILY HX OF COLON CANCER: ICD-10-CM

## 2024-02-05 DIAGNOSIS — D12.6 ADENOMATOUS POLYP OF COLON, UNSPECIFIED PART OF COLON: ICD-10-CM

## 2024-02-05 DIAGNOSIS — K52.9 CHRONIC DIARRHEA: ICD-10-CM

## 2024-02-05 DIAGNOSIS — K52.9 CHRONIC DIARRHEA: Primary | ICD-10-CM

## 2024-02-05 DIAGNOSIS — Z12.11 SCREENING FOR MALIGNANT NEOPLASM OF COLON: ICD-10-CM

## 2024-02-05 LAB
ALBUMIN SERPL BCP-MCNC: 4.3 G/DL (ref 3.4–5)
ALP SERPL-CCNC: 97 U/L (ref 33–110)
ALT SERPL W P-5'-P-CCNC: 15 U/L (ref 7–45)
ANION GAP SERPL CALC-SCNC: 12 MMOL/L (ref 10–20)
AST SERPL W P-5'-P-CCNC: 16 U/L (ref 9–39)
BILIRUB SERPL-MCNC: 0.5 MG/DL (ref 0–1.2)
BUN SERPL-MCNC: 8 MG/DL (ref 6–23)
CALCIUM SERPL-MCNC: 9.5 MG/DL (ref 8.6–10.3)
CHLORIDE SERPL-SCNC: 103 MMOL/L (ref 98–107)
CO2 SERPL-SCNC: 26 MMOL/L (ref 21–32)
CREAT SERPL-MCNC: 0.6 MG/DL (ref 0.5–1.05)
CRP SERPL-MCNC: 0.42 MG/DL
EGFRCR SERPLBLD CKD-EPI 2021: >90 ML/MIN/1.73M*2
ERYTHROCYTE [DISTWIDTH] IN BLOOD BY AUTOMATED COUNT: 13.5 % (ref 11.5–14.5)
GLUCOSE SERPL-MCNC: 148 MG/DL (ref 74–99)
HCT VFR BLD AUTO: 39.8 % (ref 36–46)
HGB BLD-MCNC: 13.8 G/DL (ref 12–16)
MCH RBC QN AUTO: 31.2 PG (ref 26–34)
MCHC RBC AUTO-ENTMCNC: 34.7 G/DL (ref 32–36)
MCV RBC AUTO: 90 FL (ref 80–100)
NRBC BLD-RTO: 0 /100 WBCS (ref 0–0)
PLATELET # BLD AUTO: 176 X10*3/UL (ref 150–450)
POTASSIUM SERPL-SCNC: 4.2 MMOL/L (ref 3.5–5.3)
PROT SERPL-MCNC: 6.8 G/DL (ref 6.4–8.2)
RBC # BLD AUTO: 4.43 X10*6/UL (ref 4–5.2)
SODIUM SERPL-SCNC: 137 MMOL/L (ref 136–145)
TTG IGA SER IA-ACNC: <1 U/ML
WBC # BLD AUTO: 8.8 X10*3/UL (ref 4.4–11.3)

## 2024-02-05 PROCEDURE — 83516 IMMUNOASSAY NONANTIBODY: CPT

## 2024-02-05 PROCEDURE — 85027 COMPLETE CBC AUTOMATED: CPT

## 2024-02-05 PROCEDURE — 86140 C-REACTIVE PROTEIN: CPT

## 2024-02-05 PROCEDURE — 99214 OFFICE O/P EST MOD 30 MIN: CPT | Performed by: NURSE PRACTITIONER

## 2024-02-05 PROCEDURE — 36415 COLL VENOUS BLD VENIPUNCTURE: CPT

## 2024-02-05 PROCEDURE — 99204 OFFICE O/P NEW MOD 45 MIN: CPT | Performed by: NURSE PRACTITIONER

## 2024-02-05 PROCEDURE — 3078F DIAST BP <80 MM HG: CPT | Performed by: NURSE PRACTITIONER

## 2024-02-05 PROCEDURE — 3075F SYST BP GE 130 - 139MM HG: CPT | Performed by: NURSE PRACTITIONER

## 2024-02-05 PROCEDURE — 80053 COMPREHEN METABOLIC PANEL: CPT

## 2024-02-05 RX ORDER — FAMOTIDINE 40 MG/1
40 TABLET, FILM COATED ORAL 2 TIMES DAILY
Qty: 60 TABLET | Refills: 11 | Status: SHIPPED | OUTPATIENT
Start: 2024-02-05 | End: 2025-02-04

## 2024-02-05 RX ORDER — OMEPRAZOLE 40 MG/1
40 CAPSULE, DELAYED RELEASE ORAL DAILY
Qty: 30 CAPSULE | Refills: 11 | Status: SHIPPED | OUTPATIENT
Start: 2024-02-05 | End: 2025-02-04

## 2024-02-05 ASSESSMENT — ENCOUNTER SYMPTOMS
DIARRHEA: 1
WEIGHT LOSS: 0
ABDOMINAL PAIN: 1
VOMITING: 0
EARLY SATIETY: 1
HEARTBURN: 1
BELCHING: 1
COUGH: 0
CHOKING: 0

## 2024-02-05 NOTE — PATIENT INSTRUCTIONS
GERD - Avoid overeating at mealtime.  Stay upright for 2 hours after eating.  Do not eat for 3-4 hours before going to bed.  Elevated the head of the bed 6 inches when you are sleeping. Avoid excessive amounts of fried foods, acidic foods, spicy foods, alcohol, caffeine, peppermint, spearmint, chocolate, non-steroidal anti- inflammatory medications, such as, Ibuprofen, Advil, Motrin, Aleve, Naproxen, Naprosyn, Moloxicam, Etodolac, and Voltaren.    Start Omeprazole 40 mg in the morning about 30 minutes before breakfast  Start Famotidine at bedtime    Review the gastroparesis diet    Complete stool tests    Follow up after procedures     You will be scheduled for a colonoscopy and endoscopy   Please read all of the instructions 7 days before your colonoscopy.  **If you are on a blood thinner you will need to contact your prescribing physician to find out if it is OK to stop these medications. If not, please contact us.  You will need to take ONLY clear liquids the ENTIRE day before your procedure. These include (clear fruit juices, soda, Gatorade, broth, jello and coffee/tea) Avoid red and purple drinks. No cream or milk in the coffee.  You will need to take a bowel preparation.  You will also need a .      To schedule a procedure please call [phone]

## 2024-02-05 NOTE — PROGRESS NOTES
Subjective   Patient ID: Radha Dela Cruz is a 58 y.o. female who presents for Heartburn and Diarrhea. PMH of HLD, atrial fibrillation (on xarelto), HTN, Type II DM (diagnosed in 32), DOMINGO, emphysema  Diarrhea   This is a chronic problem. The current episode started more than 1 year ago. The problem occurs 2 to 4 times per day. The problem has been unchanged. The stool consistency is described as Mucous (milk shake). The patient states that diarrhea does not awaken (wakes up to yrinate and has a Bm with urination) her from sleep. Associated symptoms include abdominal pain. Pertinent negatives include no coughing, vomiting or weight loss. Nothing aggravates the symptoms. There are no known risk factors. She has tried nothing for the symptoms. There is no history of bowel resection or inflammatory bowel disease.   GERD  She complains of abdominal pain, belching, dysphagia, early satiety and heartburn. She reports no chest pain, no choking or no coughing. upper abdominal pain, sternal area dysphagia, solid foods,alleviated with water. This is a chronic problem. The current episode started more than 1 year ago. The problem occurs constantly. The problem has been rapidly worsening. The heartburn duration is more than one hour. The heartburn is located in the substernum and abdomen. The heartburn is of severe intensity. The heartburn wakes her from sleep. The heartburn does not limit her activity. The heartburn doesn't change with position. The symptoms are aggravated by certain foods, smoking and caffeine. Pertinent negatives include no melena or weight loss. Risk factors include smoking/tobacco exposure and obesity (denies NSAIDs, on xarelto). The treatment provided no relief. Past procedures include an EGD.     EGD/ Colonoscopy in 2018 for constipation, bleeding, was on Xarelto--found a SSA, EGD was normal  Colonoscopy (2014)-TA     Family Hx: Brother with CRC @ 42  Mother with cancer (unsure which kind)  PSH: C section,  hysterectomy, kidney stent x 3, knee surgery, rotator cuff repair   Social Hx: current smoker, cigarettes, 1 ppd/    Review of Systems   Constitutional:  Negative for weight loss.   Respiratory:  Negative for cough and choking.    Cardiovascular:  Negative for chest pain.   Gastrointestinal:  Positive for abdominal pain, diarrhea, dysphagia and heartburn. Negative for melena and vomiting.       Objective   Physical Exam  Constitutional:       Appearance: Normal appearance. She is normal weight.   HENT:      Nose: Nose normal.   Eyes:      General: Lids are normal.   Cardiovascular:      Rate and Rhythm: Normal rate and regular rhythm.      Heart sounds: Normal heart sounds.   Pulmonary:      Effort: Pulmonary effort is normal.      Breath sounds: Normal breath sounds.   Abdominal:      General: Bowel sounds are normal.   Musculoskeletal:         General: Normal range of motion.   Skin:     General: Skin is warm and dry.   Neurological:      Mental Status: She is alert and oriented to person, place, and time.   Psychiatric:         Mood and Affect: Mood normal.         Assessment/Plan   Diagnoses and all orders for this visit:  Chronic diarrhea  -     Colonoscopy Screening; High Risk Patient; Prior h/o SSA and TA, and brother with CRC@42; Future  -     C. difficile, PCR; Future  -     Calprotectin, Fecal; Future  -     CBC; Future  -     Comprehensive Metabolic Panel; Future  -     C-Reactive Protein; Future  -     Ova/Para + Giardia/Cryptosporidium Antigen; Future  -     Stool Pathogen Panel, PCR  -     Tissue Transglutaminase IgA; Future  Screening for malignant neoplasm of colon  -     Referral to Gastroenterology  -     Colonoscopy Screening; High Risk Patient; Prior h/o SSA and TA, and brother with CRC@42; Future  Adenomatous polyp of colon, unspecified part of colon  -     Colonoscopy Screening; High Risk Patient; Prior h/o SSA and TA, and brother with CRC@42; Future  Family hx of colon cancer  -      Colonoscopy Screening; High Risk Patient; Prior h/o SSA and TA, and brother with CRC@42; Future  Gastroesophageal reflux disease, unspecified whether esophagitis present  -     EGD; Future  -     omeprazole (PriLOSEC) 40 mg DR capsule; Take 1 capsule (40 mg) by mouth once daily. Do not crush or chew.  -     famotidine (Pepcid) 40 mg tablet; Take 1 tablet (40 mg) by mouth 2 times a day.  Esophageal dysphagia  -     EGD; Future     58 year old female with a PMH of HLD, atrial fibrillation (on xarelto), HTN, Type II DM (diagnosed in 32), DOMINGO, emphysema who presents today for chronic diarrhea and severe acid reflux. This has been ongoing for many years. Review of prior colonoscopies in 2018 and 2014 found a SSA and TA. Of note, brother had colon cancer at 42. She also has dysphagia, localized to sternal area, solid foods only and bleching, early statiey. She will compelte EGD and consider GES at follow up. Neo cespedes Omeprazole andFamotidine. Complete stool tests and follow up after procedures.     Arcelia Guerra, BETHANIE-CNP 02/05/24 10:28 AM

## 2024-02-06 ENCOUNTER — LAB (OUTPATIENT)
Dept: LAB | Facility: LAB | Age: 59
End: 2024-02-06
Payer: COMMERCIAL

## 2024-02-06 ENCOUNTER — OFFICE VISIT (OUTPATIENT)
Dept: PRIMARY CARE | Facility: CLINIC | Age: 59
End: 2024-02-06
Payer: COMMERCIAL

## 2024-02-06 VITALS
BODY MASS INDEX: 32.28 KG/M2 | HEART RATE: 62 BPM | WEIGHT: 171 LBS | OXYGEN SATURATION: 97 % | DIASTOLIC BLOOD PRESSURE: 68 MMHG | HEIGHT: 61 IN | SYSTOLIC BLOOD PRESSURE: 106 MMHG

## 2024-02-06 DIAGNOSIS — Z79.4 TYPE 2 DIABETES MELLITUS WITH DIABETIC POLYNEUROPATHY, WITH LONG-TERM CURRENT USE OF INSULIN (MULTI): Primary | ICD-10-CM

## 2024-02-06 DIAGNOSIS — I48.0 PAROXYSMAL ATRIAL FIBRILLATION (MULTI): ICD-10-CM

## 2024-02-06 DIAGNOSIS — E78.2 MODERATE MIXED HYPERLIPIDEMIA NOT REQUIRING STATIN THERAPY: ICD-10-CM

## 2024-02-06 DIAGNOSIS — Z12.39 BREAST SCREENING: ICD-10-CM

## 2024-02-06 DIAGNOSIS — Z79.899 MEDICATION MANAGEMENT: ICD-10-CM

## 2024-02-06 DIAGNOSIS — E11.42 DIABETIC POLYNEUROPATHY ASSOCIATED WITH TYPE 2 DIABETES MELLITUS (MULTI): ICD-10-CM

## 2024-02-06 DIAGNOSIS — E11.42 TYPE 2 DIABETES MELLITUS WITH DIABETIC POLYNEUROPATHY, WITH LONG-TERM CURRENT USE OF INSULIN (MULTI): Primary | ICD-10-CM

## 2024-02-06 DIAGNOSIS — F41.9 ANXIETY AND DEPRESSION: ICD-10-CM

## 2024-02-06 DIAGNOSIS — E78.5 DYSLIPIDEMIA: ICD-10-CM

## 2024-02-06 DIAGNOSIS — F32.A ANXIETY AND DEPRESSION: ICD-10-CM

## 2024-02-06 LAB
AMPHETAMINES UR QL SCN: NORMAL
BARBITURATES UR QL SCN: NORMAL
BENZODIAZ UR QL SCN: NORMAL
BZE UR QL SCN: NORMAL
CANNABINOIDS UR QL SCN: NORMAL
FENTANYL+NORFENTANYL UR QL SCN: NORMAL
OPIATES UR QL SCN: NORMAL
OXYCODONE+OXYMORPHONE UR QL SCN: NORMAL
PCP UR QL SCN: NORMAL
POC HEMOGLOBIN A1C: 9.2 % (ref 4.2–6.5)

## 2024-02-06 PROCEDURE — 99214 OFFICE O/P EST MOD 30 MIN: CPT | Performed by: STUDENT IN AN ORGANIZED HEALTH CARE EDUCATION/TRAINING PROGRAM

## 2024-02-06 PROCEDURE — 83036 HEMOGLOBIN GLYCOSYLATED A1C: CPT | Performed by: STUDENT IN AN ORGANIZED HEALTH CARE EDUCATION/TRAINING PROGRAM

## 2024-02-06 PROCEDURE — 80307 DRUG TEST PRSMV CHEM ANLYZR: CPT

## 2024-02-06 PROCEDURE — 3074F SYST BP LT 130 MM HG: CPT | Performed by: STUDENT IN AN ORGANIZED HEALTH CARE EDUCATION/TRAINING PROGRAM

## 2024-02-06 PROCEDURE — 3078F DIAST BP <80 MM HG: CPT | Performed by: STUDENT IN AN ORGANIZED HEALTH CARE EDUCATION/TRAINING PROGRAM

## 2024-02-06 RX ORDER — INSULIN GLARGINE 100 [IU]/ML
INJECTION, SOLUTION SUBCUTANEOUS
Qty: 3 ML | Refills: 3 | Status: SHIPPED | OUTPATIENT
Start: 2024-02-06 | End: 2024-02-13 | Stop reason: SDUPTHER

## 2024-02-06 RX ORDER — DAPAGLIFLOZIN 10 MG/1
10 TABLET, FILM COATED ORAL DAILY
Qty: 30 TABLET | Refills: 11 | Status: SHIPPED | OUTPATIENT
Start: 2024-02-06 | End: 2024-03-19 | Stop reason: ALTCHOICE

## 2024-02-06 RX ORDER — PEN NEEDLE, DIABETIC 30 GX3/16"
NEEDLE, DISPOSABLE MISCELLANEOUS
Qty: 100 EACH | Refills: 11 | Status: SHIPPED | OUTPATIENT
Start: 2024-02-06 | End: 2025-02-05

## 2024-02-06 RX ORDER — GABAPENTIN 300 MG/1
300 CAPSULE ORAL 4 TIMES DAILY
Qty: 120 CAPSULE | Refills: 0 | Status: SHIPPED | OUTPATIENT
Start: 2024-02-06 | End: 2024-04-04

## 2024-02-06 RX ORDER — LORAZEPAM 1 MG/1
1 TABLET ORAL DAILY PRN
Qty: 30 TABLET | Refills: 0 | Status: SHIPPED | OUTPATIENT
Start: 2024-02-06 | End: 2024-03-07

## 2024-02-06 RX ORDER — ATORVASTATIN CALCIUM 80 MG/1
80 TABLET, FILM COATED ORAL DAILY
Qty: 90 TABLET | Refills: 3 | Status: SHIPPED | OUTPATIENT
Start: 2024-02-06

## 2024-02-06 NOTE — PROGRESS NOTES
Subjective   Patient ID: Radha Dela Cruz is a 58 y.o. female who presents for the following    Assessment/Plan     KESHAV 9/8/2023  Physical: 9/8/2023    #Chronic interstitial lung disease; NOS  #Hx of Septic Shock  #HTN  #HLD  #DM-2  #DOMINGO  #Paroxysmal A-Fib  #Preventative Medicine   #COPD    -Colonoscopy 4-5 years ago; Has repeat colonoscopy scheduled for this month.   -MMG: had one 2-3 years ago. Will give order for MMG  -PAP Smear: patient had cervix and uterus removed in 1999.   -UTD: has not had COVID vaccine. Does not want it. Does not want flu shot   -PHQ2: 0 while on medication     PLAN  A1c: 9.2%  Refill gabapentin  Refill zoloft  Refill buspar  Albuterol PRN for SOB   Lantus increased to 50U daily.   Continue Victoza   Actos discontinued; started on farxiga 10mg PO daily   Low carb, low fat diet with aerobic exercise 3x weekly advised.   Continue xarelto for A-Fib   Follow up with optometry for diabetic eye exam     #Generalized Anxiety with recurrent Panic Attacks  -Ativan refilled  -Buspar refilled   Follow up in 1 month       OARRS Report Reviewed and appropriate.  UDS reviewed.  I have personally reviewed the OARRS report.  I have considered the risks of abuse, dependence, addiction and diversion. I believe that it is clinically appropriate for this patient to be prescribed this medication based on documented diagnosis.     Controlled Substance Agreement:   I have printed this form and reviewed each line item with the patient and the patient has verbalized understanding.   Date of the last Controlled Substance Agreement: 2/6/24  Date of UDS 2/6/24    I discussed patient's OARRS report as well as the potential concern for overdose on prescribed opioid, sleep aid, gabapentin/Lyrica, and/or benzodiazepines. I explained that Overdose Risk Scores >460, require a Narcan prescription and places the patient at risk for potential death.      Patient understands that the risk of death can occur when using opioid,  benzodiazepines, gabapentin, Lyrica, sleep aids, and illegal drugs. The risk of death especially increases when using the above medications and or illegal drugs in combination. I have reviewed with the patient and the patient is in agreement with the terms of the  Controlled Substance Agreement.      At this point in time, patient is in compliance with the above, and has no signs of dependence or addiction to the above prescribed medications.      HPI    38-year-old female presents for follow-up examination.  Was recently in the ER last week for shortness of breath and cough.  CT of the chest done in the ED did not show any acute findings.  She is mainly here for medication refills.  She has a past medical history of diabetes, anxiety, panic attacks, interstitial lung disease, hypertension, A-fib, hyperlipidemia.  She needs medication refills on all of her medications.    In regard to diabetes patient's POC A1c is elevated at 9.2.  She states that she was on chronic steroids after hospital discharge and has not been eating very healthy.  Low-carb low-fat diet advised.  Advised patient to exercise 3 times weekly as tolerated.  We discussed her current medication regimen which includes Lantus insulin, Actos, Victoza.  Patient says that her A1c was well-controlled on Jardiance in the past but it had to be discontinued due to frequent yeast infections.  We discussed starting metformin but patient states that she has taken it in the past and that poorly tolerated GI side effects.  Will keep the conclusion to start Farxiga and will prescribe patient as needed Diflucan for yeast infections.  Patient is agreeable and medication side effects discussed.    Anxiety discussed.  Patient states that she has generalized anxiety with recurrent panic attacks.  She was given Ativan in March 2023 and states that close 30 pills lasted her until this year.  Panic attacks are very sporadic and may happen 1-2 times per week.  Otherwise  "her anxiety is well-controlled on BuSpar.    No suicidal ideation or homicidal ideation reported.    Denies fevers, chills, weight loss, lightheadedness, dizziness, vision changes, sore throat, runny nose, CP, n/v/d, abd pain, black/bloody stools, arthralgias, or new numbness/weakness/tingling in arms/legs/face.      No other complaints or concerns at this time.     PHQ2: 0 while on medication    PMH: Gout, HLD, DM, Anxiety, Paroxymal A-Fib,     Social Hx  T: quit about 2 weeks ago  A: denies  D: denies    Family Hx:   Maternal: No significant hx reported outside of CVA  Paternal: CAD    Lives with , sister, and son        Visit Vitals  /68   Pulse 62   Ht 1.549 m (5' 1\")   Wt 77.6 kg (171 lb)   SpO2 97%   BMI 32.31 kg/m²   OB Status Hysterectomy   Smoking Status Every Day   BSA 1.83 m²     PHYSICAL EXAM     Physical Exam     Visit Vitals  /68   Pulse 62   Ht 1.549 m (5' 1\")   Wt 77.6 kg (171 lb)   SpO2 97%   BMI 32.31 kg/m²   OB Status Hysterectomy   Smoking Status Every Day   BSA 1.83 m²        General: NAD. NCAT. Aox3   HEENT: PERRLA. EOMI. MMM. Nares patent bl.  Cardiovascular: RRR. No MRG. S1/S2 wnl.   Respiratory: CTABL.   GI: Soft, NT abdomen. BS present x 4.   MSK: ROM x 4. CTLS non-tender.   Extremities: BL trace non-pitting edema of the LEs. Cap refill < 2 sec.   Skin: No rashes or bruises.   Neuro: Aox3. Cranial Nerves grossly intact. Motor/sensory wnl. Diabetic foot exam shows evidence of neuropathy.   Psych: Mood wnl.    REVIEW OF SYSTEMS     ROS In HPI     No Known Allergies    Current Outpatient Medications   Medication Sig Dispense Refill    acetaminophen (Tylenol) 500 mg tablet Take 1 tablet (500 mg) by mouth if needed.      albuterol 90 mcg/actuation inhaler Inhale 2 puffs every 6 hours if needed for shortness of breath.      allopurinol (Zyloprim) 300 mg tablet Take 1 tablet (300 mg) by mouth once daily. 90 tablet 3    atorvastatin (Lipitor) 80 mg tablet Take 1 tablet (80 mg) by " mouth once daily. 90 tablet 3    busPIRone (Buspar) 7.5 mg tablet Take 1 tablet (7.5 mg) by mouth 2 times a day. 180 tablet 3    cholecalciferol (Vitamin D-3) 25 MCG (1000 UT) tablet Take 1 tablet (1,000 Units) by mouth once daily. 90 tablet 3    famotidine (Pepcid) 40 mg tablet Take 1 tablet (40 mg) by mouth 2 times a day. 60 tablet 11    FreeStyle Lite Strips strip       gabapentin (Neurontin) 300 mg capsule TAKE 1 CAPSULE BY MOUTH FOUR TIMES DAILY 120 capsule 0    Lantus Solostar U-100 Insulin 100 unit/mL (3 mL) pen Inject 45 units once daily at nighttime. 3 mL 3    LORazepam (Ativan) 1 mg tablet Take 1 tablet (1 mg) by mouth every 6 hours if needed.      metoprolol succinate XL (Toprol-XL) 50 mg 24 hr tablet Take 1 tablet (50 mg) by mouth once daily. 90 tablet 3    omeprazole (PriLOSEC) 40 mg DR capsule Take 1 capsule (40 mg) by mouth once daily. Do not crush or chew. 30 capsule 11    pioglitazone (Actos) 45 mg tablet Take 1 tablet (45 mg) by mouth once daily. 90 tablet 3    rivaroxaban (Xarelto) 20 mg tablet Take 1 tablet (20 mg) by mouth once daily in the evening. Take with meals. 90 tablet 3    rOPINIRole (Requip) 1 mg tablet Take 1 tablet (1 mg) by mouth once daily at bedtime. 90 tablet 3    sertraline (Zoloft) 100 mg tablet Take 1.5 tablets (150 mg) by mouth once daily. 90 tablet 3    Victoza 3-Chetan 0.6 mg/0.1 mL (18 mg/3 mL) injection Inject 0.3 mL (1.8 mg) under the skin once daily. 3 mL 2     No current facility-administered medications for this visit.       Objective     Lab on 02/05/2024   Component Date Value Ref Range Status    WBC 02/05/2024 8.8  4.4 - 11.3 x10*3/uL Final    nRBC 02/05/2024 0.0  0.0 - 0.0 /100 WBCs Final    RBC 02/05/2024 4.43  4.00 - 5.20 x10*6/uL Final    Hemoglobin 02/05/2024 13.8  12.0 - 16.0 g/dL Final    Hematocrit 02/05/2024 39.8  36.0 - 46.0 % Final    MCV 02/05/2024 90  80 - 100 fL Final    MCH 02/05/2024 31.2  26.0 - 34.0 pg Final    MCHC 02/05/2024 34.7  32.0 - 36.0 g/dL  Final    RDW 02/05/2024 13.5  11.5 - 14.5 % Final    Platelets 02/05/2024 176  150 - 450 x10*3/uL Final    Glucose 02/05/2024 148 (H)  74 - 99 mg/dL Final    Sodium 02/05/2024 137  136 - 145 mmol/L Final    Potassium 02/05/2024 4.2  3.5 - 5.3 mmol/L Final    Chloride 02/05/2024 103  98 - 107 mmol/L Final    Bicarbonate 02/05/2024 26  21 - 32 mmol/L Final    Anion Gap 02/05/2024 12  10 - 20 mmol/L Final    Urea Nitrogen 02/05/2024 8  6 - 23 mg/dL Final    Creatinine 02/05/2024 0.60  0.50 - 1.05 mg/dL Final    eGFR 02/05/2024 >90  >60 mL/min/1.73m*2 Final    Calcium 02/05/2024 9.5  8.6 - 10.3 mg/dL Final    Albumin 02/05/2024 4.3  3.4 - 5.0 g/dL Final    Alkaline Phosphatase 02/05/2024 97  33 - 110 U/L Final    Total Protein 02/05/2024 6.8  6.4 - 8.2 g/dL Final    AST 02/05/2024 16  9 - 39 U/L Final    Bilirubin, Total 02/05/2024 0.5  0.0 - 1.2 mg/dL Final    ALT 02/05/2024 15  7 - 45 U/L Final    C-Reactive Protein 02/05/2024 0.42  <1.00 mg/dL Final    Tissue Transglutaminase, IgA 02/05/2024 <1.0  <15.0 U/mL Final       Radiology: Reviewed imaging in powerchart.  No results found.    No family history on file.  Social History     Socioeconomic History    Marital status:      Spouse name: None    Number of children: None    Years of education: None    Highest education level: None   Occupational History    None   Tobacco Use    Smoking status: Every Day     Types: Cigarettes    Smokeless tobacco: Never   Substance and Sexual Activity    Alcohol use: Not Currently    Drug use: Never    Sexual activity: None   Other Topics Concern    None   Social History Narrative    None     Social Determinants of Health     Financial Resource Strain: Not on file   Food Insecurity: Not on file   Transportation Needs: Not on file   Physical Activity: Not on file   Stress: Not on file   Social Connections: Not on file   Intimate Partner Violence: Not on file   Housing Stability: Not on file     No past medical history on  file.  Past Surgical History:   Procedure Laterality Date    OTHER SURGICAL HISTORY  2022    Rotator cuff repair    OTHER SURGICAL HISTORY  2022    Knee replacement    OTHER SURGICAL HISTORY  2022    Bone biopsy    OTHER SURGICAL HISTORY  2022     section    OTHER SURGICAL HISTORY  2022    Hysterectomy    OTHER SURGICAL HISTORY  2022    Colonoscopy complete for polypectomy       Charting was completed using voice recognition technology and may include unintended errors.

## 2024-02-13 ENCOUNTER — TELEMEDICINE (OUTPATIENT)
Dept: PRIMARY CARE | Facility: CLINIC | Age: 59
End: 2024-02-13
Payer: COMMERCIAL

## 2024-02-13 DIAGNOSIS — Z79.4 TYPE 2 DIABETES MELLITUS WITH DIABETIC POLYNEUROPATHY, WITH LONG-TERM CURRENT USE OF INSULIN (MULTI): ICD-10-CM

## 2024-02-13 DIAGNOSIS — B37.31 YEAST INFECTION INVOLVING THE VAGINA AND SURROUNDING AREA: Primary | ICD-10-CM

## 2024-02-13 DIAGNOSIS — E11.42 TYPE 2 DIABETES MELLITUS WITH DIABETIC POLYNEUROPATHY, WITH LONG-TERM CURRENT USE OF INSULIN (MULTI): ICD-10-CM

## 2024-02-13 PROCEDURE — 99213 OFFICE O/P EST LOW 20 MIN: CPT | Performed by: STUDENT IN AN ORGANIZED HEALTH CARE EDUCATION/TRAINING PROGRAM

## 2024-02-13 RX ORDER — FLUCONAZOLE 150 MG/1
150 TABLET ORAL ONCE
Qty: 1 TABLET | Refills: 0 | Status: SHIPPED | OUTPATIENT
Start: 2024-02-13 | End: 2024-02-13

## 2024-02-13 RX ORDER — BLOOD-GLUCOSE METER
1 KIT MISCELLANEOUS 3 TIMES DAILY
Qty: 90 STRIP | Refills: 1 | Status: SHIPPED | OUTPATIENT
Start: 2024-02-13 | End: 2024-05-13

## 2024-02-13 RX ORDER — INSULIN GLARGINE 100 [IU]/ML
INJECTION, SOLUTION SUBCUTANEOUS
Qty: 3 ML | Refills: 3 | Status: SHIPPED | OUTPATIENT
Start: 2024-02-13 | End: 2024-03-19

## 2024-02-13 NOTE — PROGRESS NOTES
Subjective   Patient ID: Radha Dela Cruz is a 58 y.o. female who presents for the following    Assessment/Plan     KESHAV 9/8/2023  Physical: 9/8/2023    #Chronic interstitial lung disease; NOS  #Hx of Septic Shock  #HTN  #HLD  #DOMINGO  #Paroxysmal A-Fib  #Preventative Medicine   #COPD    -Colonoscopy 4-5 years ago; Has repeat colonoscopy scheduled for this month.   -MMG: had one 2-3 years ago. Will give order for MMG  -PAP Smear: patient had cervix and uterus removed in 1999.   -UTD: has not had COVID vaccine. Does not want it. Does not want flu shot   -PHQ2: 0 while on medication     PLAN  Continue gabapentin, zoloft, buspar   Albuterol PRN for SOB   Continue xarelto for A-Fib   Follow up with optometry for diabetic eye exam     #Type II DM- uncontrolled  #Yeast Infection related to SGLT-2-I    PLAN  -Stop farxiga  -Restart actos  -Continue Lantus 50U daily  -Accucheck TID rx   -Continue victoza; will switch to mounjaro at next visit  -Importance of diet control stressed to patient.   -Rx of diflucan 150mg PO once given for yeast infection.     #Generalized Anxiety with recurrent Panic Attacks  -Continue ativan PRN and buspar scheduled         OARRS Report Reviewed and appropriate.  UDS reviewed.  I have personally reviewed the OARRS report.  I have considered the risks of abuse, dependence, addiction and diversion. I believe that it is clinically appropriate for this patient to be prescribed this medication based on documented diagnosis.     Controlled Substance Agreement:   I have printed this form and reviewed each line item with the patient and the patient has verbalized understanding.   Date of the last Controlled Substance Agreement: 2/6/24  Date of UDS 2/6/24    I discussed patient's OARRS report as well as the potential concern for overdose on prescribed opioid, sleep aid, gabapentin/Lyrica, and/or benzodiazepines. I explained that Overdose Risk Scores >460, require a Narcan prescription and places the patient at  risk for potential death.      Patient understands that the risk of death can occur when using opioid, benzodiazepines, gabapentin, Lyrica, sleep aids, and illegal drugs. The risk of death especially increases when using the above medications and or illegal drugs in combination. I have reviewed with the patient and the patient is in agreement with the terms of the  Controlled Substance Agreement.      At this point in time, patient is in compliance with the above, and has no signs of dependence or addiction to the above prescribed medications.      Vaginal Discharge  The patient's primary symptoms include vaginal discharge.     Virtual or Telephone Consent    A telephone visit (audio only) between the patient (at the originating site) and the provider (at the distant site) was utilized to provide this telehealth service.   Verbal consent was requested and obtained from Radha Dela Cruz on this date, 02/13/24 for a telehealth visit.        58F presents for acute sick visit. Was seen last week and started on Farxiga for uncontrolled DM. Pt states that she has a bad yeast infection and has been using monistat for the past 1-2 days. No evidence of cellulitis, severe groin pain, fevers, chills. Advised patient to stop farxiga due to her complex medical hx in order to avoid complications such as Fourniers Gangrene. She will restart her actos. We will plan to change her victoza to mounjaro and patient will attempt to decrease carbohydrate intake. No other complaints or concerns at this time.     Does not check her sugars at home. Refills for diabetic testing strips sent and advised to check BG TID until her sugars are controlled.       Denies fevers, chills, weight loss, lightheadedness, dizziness, vision changes, sore throat, runny nose, CP, n/v/d, abd pain, black/bloody stools, arthralgias, or new numbness/weakness/tingling in arms/legs/face.      No other complaints or concerns at this time.     PHQ2: 0 while on  "medication    PMH: Gout, HLD, DM, Anxiety, Paroxymal A-Fib,     Social Hx  T: quit about 2 weeks ago  A: denies  D: denies    Family Hx:   Maternal: No significant hx reported outside of CVA  Paternal: CAD    Lives with , sister, and son        Visit Vitals  OB Status Hysterectomy   Smoking Status Every Day     PHYSICAL EXAM     Physical Exam     Visit Vitals  OB Status Hysterectomy   Smoking Status Every Day        Deferred  REVIEW OF SYSTEMS     ROS In HPI     No Known Allergies    Current Outpatient Medications   Medication Sig Dispense Refill    acetaminophen (Tylenol) 500 mg tablet Take 1 tablet (500 mg) by mouth if needed.      albuterol 90 mcg/actuation inhaler Inhale 2 puffs every 6 hours if needed for shortness of breath.      allopurinol (Zyloprim) 300 mg tablet Take 1 tablet (300 mg) by mouth once daily. 90 tablet 3    atorvastatin (Lipitor) 80 mg tablet Take 1 tablet (80 mg) by mouth once daily. 90 tablet 3    busPIRone (Buspar) 7.5 mg tablet Take 1 tablet (7.5 mg) by mouth 2 times a day. 180 tablet 3    cholecalciferol (Vitamin D-3) 25 MCG (1000 UT) tablet Take 1 tablet (1,000 Units) by mouth once daily. 90 tablet 3    dapagliflozin propanediol (Farxiga) 10 mg Take 1 tablet (10 mg) by mouth once daily. 30 tablet 11    famotidine (Pepcid) 40 mg tablet Take 1 tablet (40 mg) by mouth 2 times a day. 60 tablet 11    FreeStyle Lite Strips strip       gabapentin (Neurontin) 300 mg capsule Take 1 capsule (300 mg) by mouth 4 times a day. 120 capsule 0    insulin syringe,safetyneedle 29G X 1/2\" 0.5 mL syringe Use to inject 1-4 times daily as directed. 100 each 11    Lantus Solostar U-100 Insulin 100 unit/mL (3 mL) pen Inject 50 units once daily at nighttime. 3 mL 3    LORazepam (Ativan) 1 mg tablet Take 1 tablet (1 mg) by mouth once daily as needed for anxiety. 30 tablet 0    metoprolol succinate XL (Toprol-XL) 50 mg 24 hr tablet Take 1 tablet (50 mg) by mouth once daily. 90 tablet 3    omeprazole " "(PriLOSEC) 40 mg DR capsule Take 1 capsule (40 mg) by mouth once daily. Do not crush or chew. 30 capsule 11    pen needle, diabetic 31 gauge x 5/16\" needle Use to inject 1-4 times daily as directed. 100 each 11    rivaroxaban (Xarelto) 20 mg tablet Take 1 tablet (20 mg) by mouth once daily in the evening. Take with meals. 90 tablet 3    rOPINIRole (Requip) 1 mg tablet Take 1 tablet (1 mg) by mouth once daily at bedtime. 90 tablet 3    sertraline (Zoloft) 100 mg tablet Take 1.5 tablets (150 mg) by mouth once daily. 90 tablet 3    Victoza 3-Chetan 0.6 mg/0.1 mL (18 mg/3 mL) injection Inject 0.3 mL (1.8 mg) under the skin once daily. 3 mL 2     No current facility-administered medications for this visit.       Objective     Lab on 02/06/2024   Component Date Value Ref Range Status    Amphetamine Screen, Urine 02/06/2024 Presumptive Negative  Presumptive Negative Final    Barbiturate Screen, Urine 02/06/2024 Presumptive Negative  Presumptive Negative Final    Benzodiazepines Screen, Urine 02/06/2024 Presumptive Negative  Presumptive Negative Final    Cannabinoid Screen, Urine 02/06/2024 Presumptive Negative  Presumptive Negative Final    Cocaine Metabolite Screen, Urine 02/06/2024 Presumptive Negative  Presumptive Negative Final    Fentanyl Screen, Urine 02/06/2024 Presumptive Negative  Presumptive Negative Final    Opiate Screen, Urine 02/06/2024 Presumptive Negative  Presumptive Negative Final    Oxycodone Screen, Urine 02/06/2024 Presumptive Negative  Presumptive Negative Final    PCP Screen, Urine 02/06/2024 Presumptive Negative  Presumptive Negative Final   Office Visit on 02/06/2024   Component Date Value Ref Range Status    POC HEMOGLOBIN A1c 02/06/2024 9.2 (A)  4.2 - 6.5 % Final   Lab on 02/05/2024   Component Date Value Ref Range Status    WBC 02/05/2024 8.8  4.4 - 11.3 x10*3/uL Final    nRBC 02/05/2024 0.0  0.0 - 0.0 /100 WBCs Final    RBC 02/05/2024 4.43  4.00 - 5.20 x10*6/uL Final    Hemoglobin 02/05/2024 13.8  " 12.0 - 16.0 g/dL Final    Hematocrit 02/05/2024 39.8  36.0 - 46.0 % Final    MCV 02/05/2024 90  80 - 100 fL Final    MCH 02/05/2024 31.2  26.0 - 34.0 pg Final    MCHC 02/05/2024 34.7  32.0 - 36.0 g/dL Final    RDW 02/05/2024 13.5  11.5 - 14.5 % Final    Platelets 02/05/2024 176  150 - 450 x10*3/uL Final    Glucose 02/05/2024 148 (H)  74 - 99 mg/dL Final    Sodium 02/05/2024 137  136 - 145 mmol/L Final    Potassium 02/05/2024 4.2  3.5 - 5.3 mmol/L Final    Chloride 02/05/2024 103  98 - 107 mmol/L Final    Bicarbonate 02/05/2024 26  21 - 32 mmol/L Final    Anion Gap 02/05/2024 12  10 - 20 mmol/L Final    Urea Nitrogen 02/05/2024 8  6 - 23 mg/dL Final    Creatinine 02/05/2024 0.60  0.50 - 1.05 mg/dL Final    eGFR 02/05/2024 >90  >60 mL/min/1.73m*2 Final    Calcium 02/05/2024 9.5  8.6 - 10.3 mg/dL Final    Albumin 02/05/2024 4.3  3.4 - 5.0 g/dL Final    Alkaline Phosphatase 02/05/2024 97  33 - 110 U/L Final    Total Protein 02/05/2024 6.8  6.4 - 8.2 g/dL Final    AST 02/05/2024 16  9 - 39 U/L Final    Bilirubin, Total 02/05/2024 0.5  0.0 - 1.2 mg/dL Final    ALT 02/05/2024 15  7 - 45 U/L Final    C-Reactive Protein 02/05/2024 0.42  <1.00 mg/dL Final    Tissue Transglutaminase, IgA 02/05/2024 <1.0  <15.0 U/mL Final       Radiology: Reviewed imaging in powerchart.  No results found.    No family history on file.  Social History     Socioeconomic History    Marital status:      Spouse name: None    Number of children: None    Years of education: None    Highest education level: None   Occupational History    None   Tobacco Use    Smoking status: Every Day     Types: Cigarettes    Smokeless tobacco: Never   Substance and Sexual Activity    Alcohol use: Not Currently    Drug use: Never    Sexual activity: None   Other Topics Concern    None   Social History Narrative    None     Social Determinants of Health     Financial Resource Strain: Not on file   Food Insecurity: Not on file   Transportation Needs: Not on file    Physical Activity: Not on file   Stress: Not on file   Social Connections: Not on file   Intimate Partner Violence: Not on file   Housing Stability: Not on file     History reviewed. No pertinent past medical history.  Past Surgical History:   Procedure Laterality Date    OTHER SURGICAL HISTORY  2022    Rotator cuff repair    OTHER SURGICAL HISTORY  2022    Knee replacement    OTHER SURGICAL HISTORY  2022    Bone biopsy    OTHER SURGICAL HISTORY  2022     section    OTHER SURGICAL HISTORY  2022    Hysterectomy    OTHER SURGICAL HISTORY  2022    Colonoscopy complete for polypectomy       Charting was completed using voice recognition technology and may include unintended errors.

## 2024-02-27 DIAGNOSIS — Z12.11 SCREENING FOR MALIGNANT NEOPLASM OF COLON: ICD-10-CM

## 2024-03-03 RX ORDER — POLYETHYLENE GLYCOL 3350, SODIUM CHLORIDE, SODIUM BICARBONATE, POTASSIUM CHLORIDE 420; 11.2; 5.72; 1.48 G/4L; G/4L; G/4L; G/4L
4000 POWDER, FOR SOLUTION ORAL ONCE
Qty: 4000 ML | Refills: 0 | Status: SHIPPED | OUTPATIENT
Start: 2024-03-03 | End: 2024-03-03

## 2024-03-16 DIAGNOSIS — Z79.4 TYPE 2 DIABETES MELLITUS WITH DIABETIC POLYNEUROPATHY, WITH LONG-TERM CURRENT USE OF INSULIN (MULTI): ICD-10-CM

## 2024-03-16 DIAGNOSIS — E11.42 TYPE 2 DIABETES MELLITUS WITH DIABETIC POLYNEUROPATHY, WITH LONG-TERM CURRENT USE OF INSULIN (MULTI): ICD-10-CM

## 2024-03-19 ENCOUNTER — TELEPHONE (OUTPATIENT)
Dept: PRIMARY CARE | Facility: CLINIC | Age: 59
End: 2024-03-19

## 2024-03-19 ENCOUNTER — ANESTHESIA EVENT (OUTPATIENT)
Dept: GASTROENTEROLOGY | Facility: HOSPITAL | Age: 59
End: 2024-03-19
Payer: COMMERCIAL

## 2024-03-19 ENCOUNTER — HOSPITAL ENCOUNTER (OUTPATIENT)
Dept: GASTROENTEROLOGY | Facility: HOSPITAL | Age: 59
Discharge: HOME | End: 2024-03-19
Payer: COMMERCIAL

## 2024-03-19 ENCOUNTER — ANESTHESIA (OUTPATIENT)
Dept: GASTROENTEROLOGY | Facility: HOSPITAL | Age: 59
End: 2024-03-19
Payer: COMMERCIAL

## 2024-03-19 VITALS
HEIGHT: 61 IN | TEMPERATURE: 98.1 F | OXYGEN SATURATION: 97 % | WEIGHT: 167 LBS | RESPIRATION RATE: 14 BRPM | HEART RATE: 65 BPM | BODY MASS INDEX: 31.53 KG/M2 | DIASTOLIC BLOOD PRESSURE: 60 MMHG | SYSTOLIC BLOOD PRESSURE: 118 MMHG

## 2024-03-19 DIAGNOSIS — K21.9 GASTROESOPHAGEAL REFLUX DISEASE, UNSPECIFIED WHETHER ESOPHAGITIS PRESENT: Primary | ICD-10-CM

## 2024-03-19 DIAGNOSIS — Z12.11 SCREENING FOR MALIGNANT NEOPLASM OF COLON: ICD-10-CM

## 2024-03-19 DIAGNOSIS — R13.19 ESOPHAGEAL DYSPHAGIA: ICD-10-CM

## 2024-03-19 DIAGNOSIS — K52.9 CHRONIC DIARRHEA: ICD-10-CM

## 2024-03-19 DIAGNOSIS — D12.6 ADENOMATOUS POLYP OF COLON, UNSPECIFIED PART OF COLON: ICD-10-CM

## 2024-03-19 DIAGNOSIS — Z80.0 FAMILY HX OF COLON CANCER: ICD-10-CM

## 2024-03-19 DIAGNOSIS — K22.9 NODULE OF ESOPHAGUS: Primary | ICD-10-CM

## 2024-03-19 LAB — GLUCOSE BLD MANUAL STRIP-MCNC: 141 MG/DL (ref 74–99)

## 2024-03-19 PROCEDURE — 43239 EGD BIOPSY SINGLE/MULTIPLE: CPT | Performed by: INTERNAL MEDICINE

## 2024-03-19 PROCEDURE — 2500000004 HC RX 250 GENERAL PHARMACY W/ HCPCS (ALT 636 FOR OP/ED): Performed by: NURSE ANESTHETIST, CERTIFIED REGISTERED

## 2024-03-19 PROCEDURE — 7100000009 HC PHASE TWO TIME - INITIAL BASE CHARGE: Performed by: INTERNAL MEDICINE

## 2024-03-19 PROCEDURE — 3700000002 HC GENERAL ANESTHESIA TIME - EACH INCREMENTAL 1 MINUTE: Performed by: INTERNAL MEDICINE

## 2024-03-19 PROCEDURE — A45385 PR COLONOSCOPY,REMV LESN,SNARE: Performed by: ANESTHESIOLOGY

## 2024-03-19 PROCEDURE — 82947 ASSAY GLUCOSE BLOOD QUANT: CPT | Mod: 59

## 2024-03-19 PROCEDURE — 45385 COLONOSCOPY W/LESION REMOVAL: CPT | Performed by: INTERNAL MEDICINE

## 2024-03-19 PROCEDURE — 3700000001 HC GENERAL ANESTHESIA TIME - INITIAL BASE CHARGE: Performed by: INTERNAL MEDICINE

## 2024-03-19 PROCEDURE — 88305 TISSUE EXAM BY PATHOLOGIST: CPT | Performed by: STUDENT IN AN ORGANIZED HEALTH CARE EDUCATION/TRAINING PROGRAM

## 2024-03-19 PROCEDURE — A45385 PR COLONOSCOPY,REMV LESN,SNARE: Performed by: NURSE ANESTHETIST, CERTIFIED REGISTERED

## 2024-03-19 PROCEDURE — 45380 COLONOSCOPY AND BIOPSY: CPT | Performed by: INTERNAL MEDICINE

## 2024-03-19 PROCEDURE — 7100000010 HC PHASE TWO TIME - EACH INCREMENTAL 1 MINUTE: Performed by: INTERNAL MEDICINE

## 2024-03-19 PROCEDURE — 88305 TISSUE EXAM BY PATHOLOGIST: CPT | Mod: 59 | Performed by: INTERNAL MEDICINE

## 2024-03-19 RX ORDER — PROPOFOL 10 MG/ML
INJECTION, EMULSION INTRAVENOUS AS NEEDED
Status: DISCONTINUED | OUTPATIENT
Start: 2024-03-19 | End: 2024-03-19

## 2024-03-19 RX ORDER — LIRAGLUTIDE 6 MG/ML
1.8 INJECTION SUBCUTANEOUS DAILY
Qty: 9 ML | Refills: 2 | Status: SHIPPED | OUTPATIENT
Start: 2024-03-19 | End: 2024-06-17

## 2024-03-19 RX ORDER — INSULIN GLARGINE 100 [IU]/ML
INJECTION, SOLUTION SUBCUTANEOUS
Qty: 15 ML | Refills: 2 | Status: SHIPPED | OUTPATIENT
Start: 2024-03-19

## 2024-03-19 RX ORDER — SODIUM CHLORIDE, SODIUM LACTATE, POTASSIUM CHLORIDE, CALCIUM CHLORIDE 600; 310; 30; 20 MG/100ML; MG/100ML; MG/100ML; MG/100ML
20 INJECTION, SOLUTION INTRAVENOUS CONTINUOUS
Status: DISCONTINUED | OUTPATIENT
Start: 2024-03-19 | End: 2024-03-20 | Stop reason: HOSPADM

## 2024-03-19 RX ADMIN — PROPOFOL 350 MCG/KG/MIN: 10 INJECTION, EMULSION INTRAVENOUS at 13:55

## 2024-03-19 RX ADMIN — SODIUM CHLORIDE, SODIUM LACTATE, POTASSIUM CHLORIDE, AND CALCIUM CHLORIDE: 600; 310; 30; 20 INJECTION, SOLUTION INTRAVENOUS at 13:51

## 2024-03-19 ASSESSMENT — ENCOUNTER SYMPTOMS
ANAL BLEEDING: 0
CONSTIPATION: 0
FEVER: 0
VOMITING: 0
CHILLS: 0
NAUSEA: 0
RECTAL PAIN: 0
ABDOMINAL DISTENTION: 0
BLOOD IN STOOL: 0
UNEXPECTED WEIGHT CHANGE: 0
SHORTNESS OF BREATH: 0
TROUBLE SWALLOWING: 0
ABDOMINAL PAIN: 0
DIARRHEA: 0
COLOR CHANGE: 0

## 2024-03-19 ASSESSMENT — PAIN SCALES - GENERAL
PAINLEVEL_OUTOF10: 0 - NO PAIN
PAIN_LEVEL: 0
PAINLEVEL_OUTOF10: 0 - NO PAIN

## 2024-03-19 ASSESSMENT — PAIN - FUNCTIONAL ASSESSMENT
PAIN_FUNCTIONAL_ASSESSMENT: 0-10
PAIN_FUNCTIONAL_ASSESSMENT: 0-10

## 2024-03-19 ASSESSMENT — COLUMBIA-SUICIDE SEVERITY RATING SCALE - C-SSRS
2. HAVE YOU ACTUALLY HAD ANY THOUGHTS OF KILLING YOURSELF?: NO
6. HAVE YOU EVER DONE ANYTHING, STARTED TO DO ANYTHING, OR PREPARED TO DO ANYTHING TO END YOUR LIFE?: NO
1. IN THE PAST MONTH, HAVE YOU WISHED YOU WERE DEAD OR WISHED YOU COULD GO TO SLEEP AND NOT WAKE UP?: NO

## 2024-03-19 NOTE — ANESTHESIA PREPROCEDURE EVALUATION
Patient: Radha Dela Cruz    Procedure Information       Date/Time: 24 1250    Scheduled providers: Alfredo Shipley MD    Procedures:       EGD      COLONOSCOPY    Location: [facility] [place]            Relevant Problems   Cardiovascular   (+) Benign essential hypertension   (+) Hyperlipidemia   (+) Paroxysmal atrial fibrillation (CMS/HCC)      Neuro/Psych   (+) DOMINGO (generalized anxiety disorder)      Pulmonary   (+) Other emphysema (CMS/HCC)       Clinical information reviewed:   Tobacco  Allergies  Meds   Med Hx  Surg Hx  OB Status  Fam Hx  Soc   Hx        NPO Detail:  NPO/Void Status  Carbohydrate Drink Given Prior to Surgery? : N  Date of Last Liquid: 24  Time of Last Liquid: 0800  Date of Last Solid: 24  Time of Last Solid: 2300  Last Intake Type: Clear fluids  Time of Last Void: 1149         Physical Exam    Airway  Mallampati: III  TM distance: >3 FB  Neck ROM: full     Cardiovascular - normal exam     Dental - normal exam     Pulmonary - normal exam     Abdominal - normal exam           Vitals:    24 1148   BP: 129/61   Pulse: 76   Resp: 18   Temp: 36.4 °C (97.5 °F)   SpO2: 96%       Past Surgical History:   Procedure Laterality Date    OTHER SURGICAL HISTORY  2022    Rotator cuff repair    OTHER SURGICAL HISTORY  2022    Knee replacement    OTHER SURGICAL HISTORY  2022    Bone biopsy    OTHER SURGICAL HISTORY  2022     section    OTHER SURGICAL HISTORY  2022    Hysterectomy    OTHER SURGICAL HISTORY  2022    Colonoscopy complete for polypectomy     Past Medical History:   Diagnosis Date    Anxiety     Asthma     Diabetes mellitus (CMS/HCC)     Hyperlipidemia     Hypertension        Current Outpatient Medications:     acetaminophen (Tylenol) 500 mg tablet, Take 1 tablet (500 mg) by mouth if needed., Disp: , Rfl:     albuterol 90 mcg/actuation inhaler, Inhale 2 puffs every 6 hours if needed for shortness of breath., Disp: , Rfl:  "    allopurinol (Zyloprim) 300 mg tablet, Take 1 tablet (300 mg) by mouth once daily., Disp: 90 tablet, Rfl: 3    atorvastatin (Lipitor) 80 mg tablet, Take 1 tablet (80 mg) by mouth once daily., Disp: 90 tablet, Rfl: 3    busPIRone (Buspar) 7.5 mg tablet, Take 1 tablet (7.5 mg) by mouth 2 times a day., Disp: 180 tablet, Rfl: 3    cholecalciferol (Vitamin D-3) 25 MCG (1000 UT) tablet, Take 1 tablet (1,000 Units) by mouth once daily., Disp: 90 tablet, Rfl: 3    famotidine (Pepcid) 40 mg tablet, Take 1 tablet (40 mg) by mouth 2 times a day., Disp: 60 tablet, Rfl: 11    FreeStyle Lite Strips strip, Inject 1 strip under the skin 3 times a day., Disp: 90 strip, Rfl: 1    gabapentin (Neurontin) 300 mg capsule, Take 1 capsule (300 mg) by mouth 4 times a day., Disp: 120 capsule, Rfl: 0    insulin syringe,safetyneedle 29G X 1/2\" 0.5 mL syringe, Use to inject 1-4 times daily as directed., Disp: 100 each, Rfl: 11    Lantus Solostar U-100 Insulin 100 unit/mL (3 mL) pen, Inject 50 units once daily at nighttime., Disp: 15 mL, Rfl: 2    metoprolol succinate XL (Toprol-XL) 50 mg 24 hr tablet, Take 1 tablet (50 mg) by mouth once daily., Disp: 90 tablet, Rfl: 3    omeprazole (PriLOSEC) 40 mg DR capsule, Take 1 capsule (40 mg) by mouth once daily. Do not crush or chew., Disp: 30 capsule, Rfl: 11    pen needle, diabetic 31 gauge x 5/16\" needle, Use to inject 1-4 times daily as directed., Disp: 100 each, Rfl: 11    rOPINIRole (Requip) 1 mg tablet, Take 1 tablet (1 mg) by mouth once daily at bedtime., Disp: 90 tablet, Rfl: 3    sertraline (Zoloft) 100 mg tablet, Take 1.5 tablets (150 mg) by mouth once daily., Disp: 90 tablet, Rfl: 3    Victoza 3-Chetan 0.6 mg/0.1 mL (18 mg/3 mL) injection, Inject 0.3 mL (1.8 mg) under the skin once daily., Disp: 9 mL, Rfl: 2    LORazepam (Ativan) 1 mg tablet, Take 1 tablet (1 mg) by mouth once daily as needed for anxiety., Disp: 30 tablet, Rfl: 0    rivaroxaban (Xarelto) 20 mg tablet, Take 1 tablet (20 mg) " "by mouth once daily in the evening. Take with meals., Disp: 90 tablet, Rfl: 3  Prior to Admission medications    Medication Sig Start Date End Date Taking? Authorizing Provider   acetaminophen (Tylenol) 500 mg tablet Take 1 tablet (500 mg) by mouth if needed. 6/17/23  Yes Historical Provider, MD   albuterol 90 mcg/actuation inhaler Inhale 2 puffs every 6 hours if needed for shortness of breath.   Yes Historical Provider, MD   allopurinol (Zyloprim) 300 mg tablet Take 1 tablet (300 mg) by mouth once daily. 10/27/23  Yes Celia Mccollum MD   atorvastatin (Lipitor) 80 mg tablet Take 1 tablet (80 mg) by mouth once daily. 2/6/24  Yes Celia Mccollum MD   busPIRone (Buspar) 7.5 mg tablet Take 1 tablet (7.5 mg) by mouth 2 times a day. 10/27/23  Yes Celia Mccollum MD   cholecalciferol (Vitamin D-3) 25 MCG (1000 UT) tablet Take 1 tablet (1,000 Units) by mouth once daily. 12/6/23  Yes Celia Mccollum MD   famotidine (Pepcid) 40 mg tablet Take 1 tablet (40 mg) by mouth 2 times a day. 2/5/24 2/4/25 Yes SHAVON Pulliam   FreeStyle Lite Strips strip Inject 1 strip under the skin 3 times a day. 2/13/24 5/13/24 Yes Celia Mccollum MD   gabapentin (Neurontin) 300 mg capsule Take 1 capsule (300 mg) by mouth 4 times a day. 2/6/24  Yes Celia Mccollum MD   insulin syringe,safetyneedle 29G X 1/2\" 0.5 mL syringe Use to inject 1-4 times daily as directed. 2/6/24 2/5/25 Yes Celia Mccollum MD   Lantus Solostar U-100 Insulin 100 unit/mL (3 mL) pen Inject 50 units once daily at nighttime. 3/19/24  Yes eClia Mccollum MD   metoprolol succinate XL (Toprol-XL) 50 mg 24 hr tablet Take 1 tablet (50 mg) by mouth once daily. 10/27/23  Yes Celia Mccollum MD   omeprazole (PriLOSEC) 40 mg DR capsule Take 1 capsule (40 mg) by mouth once daily. Do not crush or chew. 2/5/24 2/4/25 Yes Arcelia Guerra, APRN-CNP   pen needle, diabetic 31 gauge x 5/16\" needle Use to inject 1-4 times daily as directed. 2/6/24 2/5/25 Yes Celia Mccollum MD " "  rOPINIRole (Requip) 1 mg tablet Take 1 tablet (1 mg) by mouth once daily at bedtime. 12/6/23 12/5/24 Yes Celia Mccollum MD   sertraline (Zoloft) 100 mg tablet Take 1.5 tablets (150 mg) by mouth once daily. 10/27/23  Yes Celia Mccollum MD   Victoza 3-Chetan 0.6 mg/0.1 mL (18 mg/3 mL) injection Inject 0.3 mL (1.8 mg) under the skin once daily. 3/19/24 6/17/24 Yes Celia Mccollum MD   dapagliflozin propanediol (Farxiga) 10 mg Take 1 tablet (10 mg) by mouth once daily. 2/6/24 3/19/24 Yes Celia Mccollum MD   LORazepam (Ativan) 1 mg tablet Take 1 tablet (1 mg) by mouth once daily as needed for anxiety. 2/6/24 3/7/24  Celia Mccollum MD   rivaroxaban (Xarelto) 20 mg tablet Take 1 tablet (20 mg) by mouth once daily in the evening. Take with meals. 2/6/24   Celia Mccollum MD   Lantus Solostar U-100 Insulin 100 unit/mL (3 mL) pen Inject 50 units once daily at nighttime. 2/13/24 3/19/24  MD Olesya Farris 3-Chetan 0.6 mg/0.1 mL (18 mg/3 mL) injection Inject 0.3 mL (1.8 mg) under the skin once daily. 11/27/23 3/19/24  Celia Mccollum MD     No Known Allergies  Social History     Tobacco Use    Smoking status: Every Day     Packs/day: 1.00     Years: 30.00     Additional pack years: 0.00     Total pack years: 30.00     Types: Cigarettes    Smokeless tobacco: Never   Substance Use Topics    Alcohol use: Not Currently         Chemistry    Lab Results   Component Value Date/Time     02/05/2024 1114    K 4.2 02/05/2024 1114     02/05/2024 1114    CO2 26 02/05/2024 1114    BUN 8 02/05/2024 1114    CREATININE 0.60 02/05/2024 1114    Lab Results   Component Value Date/Time    CALCIUM 9.5 02/05/2024 1114    ALKPHOS 97 02/05/2024 1114    AST 16 02/05/2024 1114    ALT 15 02/05/2024 1114    BILITOT 0.5 02/05/2024 1114          Lab Results   Component Value Date/Time    WBC 8.8 02/05/2024 1114    HGB 13.8 02/05/2024 1114    HCT 39.8 02/05/2024 1114     02/05/2024 1114     No results found for: \"PROTIME\", \"PTT\", " "\"INR\"  No results found for this or any previous visit (from the past 4464 hour(s)).     Anesthesia Plan    History of general anesthesia?: yes  History of complications of general anesthesia?: no    ASA 3     MAC     intravenous induction   Anesthetic plan and risks discussed with patient.    Plan discussed with [facility].      "

## 2024-03-19 NOTE — TELEPHONE ENCOUNTER
----- Message from Celia Mccollum MD sent at 3/19/2024  3:20 PM EDT -----  Small esophageal nodule; biopsied. Scheduled for EUS with Dr Shipley.

## 2024-03-19 NOTE — DISCHARGE INSTRUCTIONS
Patient Instructions after a Colonoscopy      The anesthetics, sedatives or narcotics which were given to you today will be acting in your body for the next 24 hours, so you might feel a little sleepy or groggy.  This feeling should slowly wear off. Carefully read and follow the instructions.     You received sedation today:  - Do not drive or operate any machinery or power tools of any kind.   - No alcoholic beverages today, not even beer or wine.  - Do not make any important decisions or sign any legal documents.  - No over the counter medications that contain alcohol or that may cause drowsiness.  - Do not make any important decisions or sign any legal documents.    While it is common to experience mild to moderate abdominal distention, gas, or belching after your procedure, if any of these symptoms occur following discharge from the GI Lab or within one week of having your procedure, call the [facility] to be advised whether a visit to your nearest Urgent Care or Emergency Department is indicated.  Take this paper with you if you go.     - If you develop an allergic reaction to the medications that were given during your procedure such as difficulty breathing, rash, hives, severe nausea, vomiting or lightheadedness.  - If you experience chest pain, shortness of breath, severe abdominal pain, fevers and chills.  -If you develop signs and symptoms of bleeding such as blood in your spit, if your stools turn black, tarry, or bloody  - If you have not urinated within 8 hours following your procedure.  - If your IV site becomes painful, red, inflamed, or looks infected.    If you received a biopsy/polypectomy/sphincterotomy the following instructions apply below:    __ Do not use Aspirin containing products, non-steroidal medications or anti-coagulants for one week following your procedure. (Examples of these types of medications are: Advil, Arthrotec, Aleve, Coumadin, Ecotrin, Heparin, Ibuprofen,  Indocin, Motrin, Naprosyn, Nuprin, Plavix, Vioxx, and Voltarin, or their generic forms.  This list is not all-inclusive.  Check with your physician or pharmacist before resuming medications.)   __ Eat a soft diet today.  Avoid foods that are poorly digested for the next 24 hours.  These foods would include: nuts, beans, lettuce, red meats, and fried foods. Start with liquids and advance your diet as tolerated, gradually work up to eating solids.   __ Do not have a Barium Study or Enema for one week.    Your physician recommends the additional following instructions:    -You have a contact number available for emergencies. The signs and symptoms of potential delayed complications were discussed with you. You may return to normal activities tomorrow.  -Resume your previous diet.  -Continue your present medications.   -We are waiting for your pathology results.  -Your physician has recommended a repeat colonoscopy (date to be determined after pending pathology results are reviewed) for surveillance based on pathology results.  -The findings and recommendations have been discussed with you.  -The findings and recommendations were discussed with your family.  - Please see Medication Reconciliation Form for new medication/medications prescribed.       If you experience any problems or have any questions following discharge from the GI Lab, please call:        Nurse Signature                                                                        Date___________________                                                                            Patient/Responsible Party Signature                                        Date___________________

## 2024-03-19 NOTE — H&P
History Of Present Illness  Radha Dela Cruz is a 58 y.o. female presenting with Dysphagia, chronic diarrhea.      Past Medical History  Past Medical History:   Diagnosis Date    Anxiety     Asthma     Diabetes mellitus (CMS/HCC)     Hyperlipidemia     Hypertension      Surgical History  Past Surgical History:   Procedure Laterality Date    OTHER SURGICAL HISTORY  2022    Rotator cuff repair    OTHER SURGICAL HISTORY  2022    Knee replacement    OTHER SURGICAL HISTORY  2022    Bone biopsy    OTHER SURGICAL HISTORY  2022     section    OTHER SURGICAL HISTORY  2022    Hysterectomy    OTHER SURGICAL HISTORY  2022    Colonoscopy complete for polypectomy     Social History  She reports that she has been smoking cigarettes. She has a 30.00 pack-year smoking history. She has never used smokeless tobacco. She reports that she does not currently use alcohol. She reports that she does not use drugs.    Family History  No family history on file.     Allergies  No Known Allergies  Review of Systems   Constitutional:  Negative for chills, fever and unexpected weight change.   HENT:  Negative for trouble swallowing.    Respiratory:  Negative for shortness of breath.    Cardiovascular:  Negative for chest pain.   Gastrointestinal:  Negative for abdominal distention, abdominal pain, anal bleeding, blood in stool, constipation, diarrhea, nausea, rectal pain and vomiting.   Skin:  Negative for color change.        Physical Exam  Vitals reviewed.   Constitutional:       General: She is awake.      Appearance: Normal appearance.   HENT:      Head: Normocephalic and atraumatic.      Nose: Nose normal.      Mouth/Throat:      Mouth: Mucous membranes are moist.   Eyes:      Pupils: Pupils are equal, round, and reactive to light.   Cardiovascular:      Rate and Rhythm: Normal rate.   Pulmonary:      Effort: Pulmonary effort is normal.   Neurological:      Mental Status: She is alert and oriented to  "person, place, and time. Mental status is at baseline.   Psychiatric:         Attention and Perception: Attention and perception normal.         Mood and Affect: Mood normal.         Behavior: Behavior normal.          Last Recorded Vitals  Blood pressure 129/61, pulse 76, temperature 36.4 °C (97.5 °F), temperature source Temporal, resp. rate 18, height 1.549 m (5' 1\"), weight 75.8 kg (167 lb), SpO2 96 %.    Assessment/Plan   Dysphagia  Chronic diarrhea  EGD  Colonoscopy     Alfredo Shipley MD  "

## 2024-03-19 NOTE — ANESTHESIA POSTPROCEDURE EVALUATION
Patient: Radha Dela Cruz    Procedure Summary       Date: 03/19/24 Room / Location: [facility]    Anesthesia Start: 1351 Anesthesia Stop:     Procedures:       EGD      COLONOSCOPY Diagnosis:       Gastroesophageal reflux disease, unspecified whether esophagitis present      Esophageal dysphagia      Screening for malignant neoplasm of colon      Chronic diarrhea      Adenomatous polyp of colon, unspecified part of colon      Family hx of colon cancer      Gastroesophageal reflux disease, unspecified whether esophagitis present    Scheduled Providers: Alfredo Shipley MD Responsible Provider: Venecia Hall MD    Anesthesia Type: MAC ASA Status: 3            Anesthesia Type: MAC    Vitals Value Taken Time   /56 03/19/24 1427   Temp 36.7 03/19/24 1427   Pulse 63 03/19/24 1427   Resp 12 03/19/24 1427   SpO2 96 03/19/24 1427       Anesthesia Post Evaluation    Patient location during evaluation: PACU  Patient participation: complete - patient participated  Level of consciousness: awake and alert  Pain score: 0  Pain management: adequate  Airway patency: patent  Cardiovascular status: acceptable  Respiratory status: acceptable  Hydration status: acceptable  Postoperative Nausea and Vomiting: none        No notable events documented.    
Initial (On Arrival)

## 2024-03-22 LAB
LABORATORY COMMENT REPORT: NORMAL
PATH REPORT.FINAL DX SPEC: NORMAL
PATH REPORT.GROSS SPEC: NORMAL
PATH REPORT.RELEVANT HX SPEC: NORMAL
PATH REPORT.TOTAL CANCER: NORMAL

## 2024-04-04 DIAGNOSIS — E11.42 DIABETIC POLYNEUROPATHY ASSOCIATED WITH TYPE 2 DIABETES MELLITUS (MULTI): ICD-10-CM

## 2024-04-04 RX ORDER — GABAPENTIN 300 MG/1
300 CAPSULE ORAL 4 TIMES DAILY
Qty: 120 CAPSULE | Refills: 0 | Status: SHIPPED | OUTPATIENT
Start: 2024-04-04

## 2024-04-09 ENCOUNTER — APPOINTMENT (OUTPATIENT)
Dept: PRIMARY CARE | Facility: CLINIC | Age: 59
End: 2024-04-09
Payer: COMMERCIAL

## 2024-04-10 DIAGNOSIS — E11.42 TYPE 2 DIABETES MELLITUS WITH DIABETIC POLYNEUROPATHY, WITH LONG-TERM CURRENT USE OF INSULIN (MULTI): ICD-10-CM

## 2024-04-10 DIAGNOSIS — Z79.4 TYPE 2 DIABETES MELLITUS WITH DIABETIC POLYNEUROPATHY, WITH LONG-TERM CURRENT USE OF INSULIN (MULTI): ICD-10-CM

## 2024-04-10 RX ORDER — PIOGLITAZONEHYDROCHLORIDE 45 MG/1
45 TABLET ORAL
Qty: 90 TABLET | Refills: 3 | Status: SHIPPED | OUTPATIENT
Start: 2024-04-10

## 2024-04-22 ENCOUNTER — ANESTHESIA (OUTPATIENT)
Dept: GASTROENTEROLOGY | Facility: HOSPITAL | Age: 59
End: 2024-04-22
Payer: COMMERCIAL

## 2024-04-22 ENCOUNTER — HOSPITAL ENCOUNTER (OUTPATIENT)
Dept: GASTROENTEROLOGY | Facility: HOSPITAL | Age: 59
Setting detail: OUTPATIENT SURGERY
Discharge: HOME | End: 2024-04-22
Payer: COMMERCIAL

## 2024-04-22 ENCOUNTER — ANESTHESIA EVENT (OUTPATIENT)
Dept: GASTROENTEROLOGY | Facility: HOSPITAL | Age: 59
End: 2024-04-22
Payer: COMMERCIAL

## 2024-04-22 VITALS
HEART RATE: 84 BPM | TEMPERATURE: 97.5 F | RESPIRATION RATE: 16 BRPM | DIASTOLIC BLOOD PRESSURE: 68 MMHG | SYSTOLIC BLOOD PRESSURE: 124 MMHG | WEIGHT: 165 LBS | OXYGEN SATURATION: 97 % | BODY MASS INDEX: 31.15 KG/M2 | HEIGHT: 61 IN

## 2024-04-22 DIAGNOSIS — K22.9 NODULE OF ESOPHAGUS: ICD-10-CM

## 2024-04-22 LAB — GLUCOSE BLD MANUAL STRIP-MCNC: 171 MG/DL (ref 74–99)

## 2024-04-22 PROCEDURE — 2500000004 HC RX 250 GENERAL PHARMACY W/ HCPCS (ALT 636 FOR OP/ED): Performed by: NURSE ANESTHETIST, CERTIFIED REGISTERED

## 2024-04-22 PROCEDURE — 3700000002 HC GENERAL ANESTHESIA TIME - EACH INCREMENTAL 1 MINUTE

## 2024-04-22 PROCEDURE — 43259 EGD US EXAM DUODENUM/JEJUNUM: CPT | Performed by: STUDENT IN AN ORGANIZED HEALTH CARE EDUCATION/TRAINING PROGRAM

## 2024-04-22 PROCEDURE — 3700000001 HC GENERAL ANESTHESIA TIME - INITIAL BASE CHARGE

## 2024-04-22 PROCEDURE — A43259 PR EDG US EXAM SURGICAL ALTER STOM DUODENUM/JEJUNUM: Performed by: ANESTHESIOLOGY

## 2024-04-22 PROCEDURE — 43237 ENDOSCOPIC US EXAM ESOPH: CPT | Performed by: STUDENT IN AN ORGANIZED HEALTH CARE EDUCATION/TRAINING PROGRAM

## 2024-04-22 PROCEDURE — A43259 PR EDG US EXAM SURGICAL ALTER STOM DUODENUM/JEJUNUM: Performed by: NURSE ANESTHETIST, CERTIFIED REGISTERED

## 2024-04-22 PROCEDURE — 7100000009 HC PHASE TWO TIME - INITIAL BASE CHARGE

## 2024-04-22 PROCEDURE — 2500000005 HC RX 250 GENERAL PHARMACY W/O HCPCS: Performed by: NURSE ANESTHETIST, CERTIFIED REGISTERED

## 2024-04-22 PROCEDURE — 82947 ASSAY GLUCOSE BLOOD QUANT: CPT

## 2024-04-22 RX ORDER — LIDOCAINE HYDROCHLORIDE 20 MG/ML
INJECTION, SOLUTION EPIDURAL; INFILTRATION; INTRACAUDAL; PERINEURAL AS NEEDED
Status: DISCONTINUED | OUTPATIENT
Start: 2024-04-22 | End: 2024-04-22

## 2024-04-22 RX ORDER — SODIUM CHLORIDE, SODIUM LACTATE, POTASSIUM CHLORIDE, CALCIUM CHLORIDE 600; 310; 30; 20 MG/100ML; MG/100ML; MG/100ML; MG/100ML
INJECTION, SOLUTION INTRAVENOUS CONTINUOUS PRN
Status: DISCONTINUED | OUTPATIENT
Start: 2024-04-22 | End: 2024-04-22

## 2024-04-22 RX ORDER — GLYCOPYRROLATE 0.2 MG/ML
INJECTION INTRAMUSCULAR; INTRAVENOUS AS NEEDED
Status: DISCONTINUED | OUTPATIENT
Start: 2024-04-22 | End: 2024-04-22

## 2024-04-22 RX ORDER — PROPOFOL 10 MG/ML
INJECTION, EMULSION INTRAVENOUS CONTINUOUS PRN
Status: DISCONTINUED | OUTPATIENT
Start: 2024-04-22 | End: 2024-04-22

## 2024-04-22 RX ADMIN — PROPOFOL 30 MG: 10 INJECTION, EMULSION INTRAVENOUS at 09:34

## 2024-04-22 RX ADMIN — PROPOFOL 40 MG: 10 INJECTION, EMULSION INTRAVENOUS at 09:41

## 2024-04-22 RX ADMIN — PROPOFOL 30 MG: 10 INJECTION, EMULSION INTRAVENOUS at 09:33

## 2024-04-22 RX ADMIN — LIDOCAINE HYDROCHLORIDE 60 MG: 20 INJECTION, SOLUTION EPIDURAL; INFILTRATION; INTRACAUDAL; PERINEURAL at 09:46

## 2024-04-22 RX ADMIN — LIDOCAINE HYDROCHLORIDE 40 MG: 20 INJECTION, SOLUTION EPIDURAL; INFILTRATION; INTRACAUDAL; PERINEURAL at 09:32

## 2024-04-22 RX ADMIN — LIDOCAINE HYDROCHLORIDE 40 MG: 20 INJECTION, SOLUTION EPIDURAL; INFILTRATION; INTRACAUDAL; PERINEURAL at 09:42

## 2024-04-22 RX ADMIN — PROPOFOL 10 MG: 10 INJECTION, EMULSION INTRAVENOUS at 09:47

## 2024-04-22 RX ADMIN — LIDOCAINE HYDROCHLORIDE 40 MG: 20 INJECTION, SOLUTION EPIDURAL; INFILTRATION; INTRACAUDAL; PERINEURAL at 09:31

## 2024-04-22 RX ADMIN — PROPOFOL 30 MG: 10 INJECTION, EMULSION INTRAVENOUS at 09:32

## 2024-04-22 RX ADMIN — SODIUM CHLORIDE, POTASSIUM CHLORIDE, SODIUM LACTATE AND CALCIUM CHLORIDE: 600; 310; 30; 20 INJECTION, SOLUTION INTRAVENOUS at 09:26

## 2024-04-22 RX ADMIN — LIDOCAINE HYDROCHLORIDE 20 MG: 20 INJECTION, SOLUTION EPIDURAL; INFILTRATION; INTRACAUDAL; PERINEURAL at 09:34

## 2024-04-22 RX ADMIN — GLYCOPYRROLATE 0.2 MG: 0.2 INJECTION, SOLUTION INTRAMUSCULAR; INTRAVENOUS at 09:30

## 2024-04-22 RX ADMIN — PROPOFOL 150 MCG/KG/MIN: 10 INJECTION, EMULSION INTRAVENOUS at 09:31

## 2024-04-22 ASSESSMENT — PAIN - FUNCTIONAL ASSESSMENT
PAIN_FUNCTIONAL_ASSESSMENT: 0-10

## 2024-04-22 ASSESSMENT — COLUMBIA-SUICIDE SEVERITY RATING SCALE - C-SSRS
1. IN THE PAST MONTH, HAVE YOU WISHED YOU WERE DEAD OR WISHED YOU COULD GO TO SLEEP AND NOT WAKE UP?: NO
2. HAVE YOU ACTUALLY HAD ANY THOUGHTS OF KILLING YOURSELF?: NO
6. HAVE YOU EVER DONE ANYTHING, STARTED TO DO ANYTHING, OR PREPARED TO DO ANYTHING TO END YOUR LIFE?: NO

## 2024-04-22 ASSESSMENT — PAIN SCALES - GENERAL
PAINLEVEL_OUTOF10: 0 - NO PAIN

## 2024-04-22 NOTE — LETTER
April 22, 2024     Patient: Radha Dela Cruz   YOB: 1965   Date of Visit: 4/22/2024       To Whom It May Concern:    Gabriel Dela Cruz accompanied Ms. Radha Dela Cruz on 4/22/2024 for a procedure. Please excuse Gabriel for his absence from work on this day to make the appointment.    If you have any questions or concerns, please don't hesitate to call.    Sincerely,         Andrae Perales MD

## 2024-04-22 NOTE — DISCHARGE INSTRUCTIONS
Patient Instructions after an Endoscopy      Post-procedure recommendations:  - The patient will be observed post-procedure, until all discharge criteria are met.   - Discharge the patient to home with family member/escort.  - Resume previous diet.  - Continue present medications.  - Observe patient's clinical course following today's procedure with therapeutic intervention.   - Watch for bleeding, perforation, and infection.   - Follow-up with referring physician.   - Return to primary care physician.  - The patient has a contact number available for  emergencies.  The signs and symptoms of potential delayed complications were discussed with the patient.  Return to normal activities tomorrow.  Written discharge instructions were provided to the patient.    The anesthetics, sedatives or narcotics which were given to you today will be acting in your body for the next 24 hours, so you might feel a little sleepy or groggy.  This feeling should slowly wear off. Carefully read and follow the instructions.     You received sedation today:  - Do not drive or operate any machinery or power tools of any kind.   - No alcoholic beverages today, not even beer or wine.  - Do not make any important decisions or sign any legal documents.  - No over the counter medications that contain alcohol or that may cause drowsiness.  - Do not make any important decisions or sign any legal documents.    While it is common to experience mild to moderate abdominal distention, gas, or belching after your procedure, if any of these symptoms occur following discharge from the GI Lab or within one week of having your procedure, call the [facility] to be advised whether a visit to your nearest Urgent Care or Emergency Department is indicated.  Take this paper with you if you go:  - If you develop an allergic reaction to the medications that were given during your procedure such as difficulty breathing, rash, hives, severe nausea,  vomiting or lightheadedness.  - If you experience chest pain, shortness of breath, severe abdominal pain, fevers and chills.  - If you develop signs and symptoms of bleeding such as blood in your spit, if your stools turn black, tarry, or bloody.  - If you have not urinated within 8 hours following your procedure.  - If your IV site becomes painful, red, inflamed, or looks infected.       If you experience any problems or have any questions following discharge from the GI Lab, please call: [phone]

## 2024-04-22 NOTE — ANESTHESIA PREPROCEDURE EVALUATION
Patient: Radha Dela Cruz    Procedure Information       Date/Time: 04/22/24 1030    Scheduled providers: Andrae Perales MD    Procedure: ENDOSCOPIC ULTRASOUND (UPPER)    Location: [facility]            Relevant Problems   Cardiac   (+) Benign essential hypertension   (+) Hyperlipidemia   (+) Paroxysmal atrial fibrillation (Multi)      Pulmonary   (+) Other emphysema (Multi)      Neuro   (+) DOMINGO (generalized anxiety disorder)       Clinical information reviewed:   Tobacco  Allergies  Meds   Med Hx  Surg Hx  OB Status  Fam Hx  Soc   Hx        NPO Detail:  NPO/Void Status  Carbohydrate Drink Given Prior to Surgery? : N  Date of Last Liquid: 04/21/24  Time of Last Liquid: 2000  Date of Last Solid: 04/21/24  Time of Last Solid: 2000  Last Intake Type: Food  Time of Last Void: 0800         Physical Exam    Airway  Mallampati: II  TM distance: >3 FB     Cardiovascular   Rhythm: regular  Rate: normal     Dental - normal exam     Pulmonary   Breath sounds clear to auscultation     Abdominal        Anesthesia Plan    History of general anesthesia?: yes  History of complications of general anesthesia?: no    ASA 2     MAC     intravenous induction   Postoperative administration of opioids is intended.  Anesthetic plan and risks discussed with patient.    Plan discussed with CRNA.

## 2024-04-22 NOTE — ANESTHESIA POSTPROCEDURE EVALUATION
"Patient: Radha Dela Cruz    Procedure Summary       Date: 04/22/24 Room / Location: [facility]    Anesthesia Start: 0917 Anesthesia Stop: 1002    Procedure: ENDOSCOPIC ULTRASOUND (UPPER) Diagnosis: Nodule of esophagus    Scheduled Providers: Andrae Perales MD Responsible Provider: Maciel Gant DO    Anesthesia Type: MAC ASA Status: 2            Anesthesia Type: MAC    /66   Pulse 60   Temp 36.5 °C (97.7 °F) (Temporal)   Resp 18   Ht 1.549 m (5' 1\")   Wt 74.8 kg (165 lb)   SpO2 97%   BMI 31.18 kg/m²        Anesthesia Post Evaluation    Patient location during evaluation: PACU  Patient participation: complete - patient participated  Level of consciousness: awake and alert  Pain management: adequate  Airway patency: patent  Cardiovascular status: acceptable  Respiratory status: acceptable and room air  Hydration status: acceptable  Postoperative Nausea and Vomiting: none        There were no known notable events for this encounter.    "

## 2024-04-22 NOTE — H&P
Procedure H&P    Patient Profile-Procedures  Name Radha Dela Cruz  Date of Birth 1965  MRN [id number]  Address   [address] [id number] [address] [address]    Primary Phone Number [phone]  Secondary Phone Number    Edward Bergman    Procedure(s):  Procedures: EGD and EUS  Primary contact name and number   Extended Emergency Contact Information  Primary Emergency Contact: Gabriel Dela Cruz  Home Phone: [phone]  Relation: Spouse    General Health  Weight   Vitals:    04/22/24 0904   Weight: 74.8 kg (165 lb)     BMI Body mass index is 31.18 kg/m².    Allergies  No Known Allergies    Past Medical History   Past Medical History:   Diagnosis Date    Anxiety     Asthma ([facility])     Diabetes mellitus (Multi)     Hyperlipidemia     Hypertension        Provider assessment  Diagnosis:  esophageal lesion  Medication Reviewed - yes  Prior to Admission medications    Medication Sig Start Date End Date Taking? Authorizing Provider   acetaminophen (Tylenol) 500 mg tablet Take 1 tablet (500 mg) by mouth if needed. 6/17/23  Yes Historical Provider, MD   albuterol 90 mcg/actuation inhaler Inhale 2 puffs every 6 hours if needed for shortness of breath.   Yes Historical Provider, MD   allopurinol (Zyloprim) 300 mg tablet Take 1 tablet (300 mg) by mouth once daily. 10/27/23  Yes Celia Mccollum MD   atorvastatin (Lipitor) 80 mg tablet Take 1 tablet (80 mg) by mouth once daily. 2/6/24  Yes Celia Mccollum MD   busPIRone (Buspar) 7.5 mg tablet Take 1 tablet (7.5 mg) by mouth 2 times a day. 10/27/23  Yes Celia Mccollum MD   cholecalciferol (Vitamin D-3) 25 MCG (1000 UT) tablet Take 1 tablet (1,000 Units) by mouth once daily. 12/6/23  Yes Celia Mccollum MD   famotidine (Pepcid) 40 mg tablet Take 1 tablet (40 mg) by mouth 2 times a day. 2/5/24 2/4/25 Yes Arcelia Guerra APRN-CNP   FreeStyle Lite Strips strip Inject 1 strip under the skin 3 times a day. 2/13/24 5/13/24 Yes Celia Mccollum MD   gabapentin  "(Neurontin) 300 mg capsule Take 1 capsule (300 mg) by mouth 4 times a day. 4/4/24  Yes Celia Mccollum MD   insulin syringe,safetyneedle 29G X 1/2\" 0.5 mL syringe Use to inject 1-4 times daily as directed. 2/6/24 2/5/25 Yes Celia Mccollum MD   Lantus Solostar U-100 Insulin 100 unit/mL (3 mL) pen Inject 50 units once daily at nighttime. 3/19/24  Yes Celia Mccollum MD   metoprolol succinate XL (Toprol-XL) 50 mg 24 hr tablet Take 1 tablet (50 mg) by mouth once daily. 10/27/23  Yes Celia Mccollum MD   omeprazole (PriLOSEC) 40 mg DR capsule Take 1 capsule (40 mg) by mouth once daily. Do not crush or chew. 2/5/24 2/4/25 Yes Arcelia Guerra APRN-CNP   pen needle, diabetic 31 gauge x 5/16\" needle Use to inject 1-4 times daily as directed. 2/6/24 2/5/25 Yes Celia Mccollum MD   pioglitazone (Actos) 45 mg tablet Take 1 tablet (45 mg) by mouth once daily. 4/10/24  Yes Celia Mccollum MD   rOPINIRole (Requip) 1 mg tablet Take 1 tablet (1 mg) by mouth once daily at bedtime. 12/6/23 12/5/24 Yes Celia Mccollum MD   sertraline (Zoloft) 100 mg tablet Take 1.5 tablets (150 mg) by mouth once daily. 10/27/23  Yes Celia Mccollum MD   Victoza 3-Chetan 0.6 mg/0.1 mL (18 mg/3 mL) injection Inject 0.3 mL (1.8 mg) under the skin once daily. 3/19/24 6/17/24 Yes Celia Mccollum MD   LORazepam (Ativan) 1 mg tablet Take 1 tablet (1 mg) by mouth once daily as needed for anxiety. 2/6/24 3/7/24  Celia Mccollum MD   rivaroxaban (Xarelto) 20 mg tablet Take 1 tablet (20 mg) by mouth once daily in the evening. Take with meals. 2/6/24   Celia Mccollum MD       Physical Exam  Vitals:    04/22/24 1000   BP: (!) 145/93   Pulse: 91   Resp: 18   Temp: 36.4 °C (97.5 °F)   SpO2: 96%        General: A&Ox3, NAD.  HEENT: AT/NC.   CV: RRR. No murmur.  Resp: CTA bilaterally. No wheezing, rhonchi or rales.   GI: Soft, NT/ND. BSx4.  Extrem: No edema. Pulses intact.  Neuro: No focal deficits.   Psych: Normal mood and affect.      Procedure Plan - pre-procedural " (re)assesment completed by physician:  discharge/transfer patient when discharge criteria met    Andrae Perales MD  4/22/2024 10:31 AM

## 2024-05-23 ENCOUNTER — APPOINTMENT (OUTPATIENT)
Dept: PRIMARY CARE | Facility: CLINIC | Age: 59
End: 2024-05-23
Payer: COMMERCIAL

## 2024-05-29 NOTE — RESULT ENCOUNTER NOTE
During recent colonoscopy a polyp was seen and completely removed.  Pathology results show this polyp as tubular adenoma.  This kind of polyp is benign but precancerous.  Based on pathology results I recommend repeating colonoscopy in 5 years.  Do not hesitate to contact me if you have any questions or concerns.

## 2024-06-16 DIAGNOSIS — E11.42 TYPE 2 DIABETES MELLITUS WITH DIABETIC POLYNEUROPATHY, WITH LONG-TERM CURRENT USE OF INSULIN (MULTI): ICD-10-CM

## 2024-06-16 DIAGNOSIS — Z79.4 TYPE 2 DIABETES MELLITUS WITH DIABETIC POLYNEUROPATHY, WITH LONG-TERM CURRENT USE OF INSULIN (MULTI): ICD-10-CM

## 2024-06-17 RX ORDER — INSULIN GLARGINE 100 [IU]/ML
INJECTION, SOLUTION SUBCUTANEOUS
Qty: 15 ML | Refills: 2 | Status: SHIPPED | OUTPATIENT
Start: 2024-06-17

## 2024-06-17 RX ORDER — LIRAGLUTIDE 6 MG/ML
INJECTION SUBCUTANEOUS
Qty: 9 ML | Refills: 2 | Status: SHIPPED | OUTPATIENT
Start: 2024-06-17